# Patient Record
Sex: MALE | Race: OTHER | NOT HISPANIC OR LATINO | Employment: FULL TIME | URBAN - METROPOLITAN AREA
[De-identification: names, ages, dates, MRNs, and addresses within clinical notes are randomized per-mention and may not be internally consistent; named-entity substitution may affect disease eponyms.]

---

## 2022-06-23 ENCOUNTER — ANESTHESIA EVENT (OUTPATIENT)
Dept: SURGERY | Facility: HOSPITAL | Age: 47
DRG: 957 | End: 2022-06-23
Payer: OTHER MISCELLANEOUS

## 2022-06-23 ENCOUNTER — ANESTHESIA (OUTPATIENT)
Dept: SURGERY | Facility: HOSPITAL | Age: 47
DRG: 957 | End: 2022-06-23
Payer: OTHER MISCELLANEOUS

## 2022-06-23 ENCOUNTER — HOSPITAL ENCOUNTER (INPATIENT)
Facility: HOSPITAL | Age: 47
LOS: 18 days | Discharge: HOME | DRG: 957 | End: 2022-07-11
Attending: EMERGENCY MEDICINE | Admitting: SURGERY
Payer: OTHER MISCELLANEOUS

## 2022-06-23 DIAGNOSIS — S62.92XA CLOSED FRACTURE OF LEFT HAND, INITIAL ENCOUNTER: Primary | ICD-10-CM

## 2022-06-23 DIAGNOSIS — I50.30 (HFPEF) HEART FAILURE WITH PRESERVED EJECTION FRACTION: ICD-10-CM

## 2022-06-23 DIAGNOSIS — I82.409 DVT (DEEP VENOUS THROMBOSIS): ICD-10-CM

## 2022-06-23 DIAGNOSIS — T14.90XA TRAUMA: ICD-10-CM

## 2022-06-23 DIAGNOSIS — I26.99 PULMONARY EMBOLI: ICD-10-CM

## 2022-06-23 LAB
ALBUMIN SERPL-MCNC: 3.7 GM/DL (ref 3.5–5)
ALBUMIN/GLOB SERPL: 1.2 RATIO (ref 1.1–2)
ALP SERPL-CCNC: 137 UNIT/L (ref 40–150)
ALT SERPL-CCNC: 71 UNIT/L (ref 0–55)
AMPHET UR QL SCN: NEGATIVE
APPEARANCE UR: CLEAR
APTT PPP: 26.1 SECONDS (ref 23.2–33.7)
AST SERPL-CCNC: 66 UNIT/L (ref 5–34)
BACTERIA #/AREA URNS AUTO: NORMAL /HPF
BARBITURATE SCN PRESENT UR: NEGATIVE
BASE EXCESS ARTERIAL: -2.9 MMOL/L (ref -2–2)
BASOPHILS # BLD AUTO: 0.13 X10(3)/MCL (ref 0–0.2)
BASOPHILS NFR BLD AUTO: 0.6 %
BENZODIAZ UR QL SCN: POSITIVE
BILIRUB UR QL STRIP.AUTO: NEGATIVE MG/DL
BILIRUBIN DIRECT+TOT PNL SERPL-MCNC: 0.8 MG/DL
BUN SERPL-MCNC: 8.7 MG/DL (ref 8.9–20.6)
CALCIUM SERPL-MCNC: 8.8 MG/DL (ref 8.4–10.2)
CANNABINOIDS UR QL SCN: NEGATIVE
CHLORIDE SERPL-SCNC: 105 MMOL/L (ref 98–107)
CO2 SERPL-SCNC: 24 MMOL/L (ref 22–29)
COCAINE UR QL SCN: NEGATIVE
COLOR UR AUTO: YELLOW
CREAT SERPL-MCNC: 0.81 MG/DL (ref 0.73–1.18)
EOSINOPHIL # BLD AUTO: 0.25 X10(3)/MCL (ref 0–0.9)
EOSINOPHIL NFR BLD AUTO: 1.2 %
ERYTHROCYTE [DISTWIDTH] IN BLOOD BY AUTOMATED COUNT: 14.1 % (ref 11.5–17)
ETHANOL SERPL-MCNC: <10 MG/DL
FENTANYL UR QL SCN: NEGATIVE
GLOBULIN SER-MCNC: 3 GM/DL (ref 2.4–3.5)
GLUCOSE SERPL-MCNC: 168 MG/DL (ref 74–100)
GLUCOSE UR QL STRIP.AUTO: NEGATIVE MG/DL
GROUP & RH: NORMAL
HCO3 ARTERIAL: 23.7 MMOL/L (ref 18–23)
HCT VFR BLD AUTO: 44.9 %
HGB BLD-MCNC: 14.5 G/DL
HGB BLD-MCNC: 14.5 GM/DL
IMM GRANULOCYTES # BLD AUTO: 0.21 X10(3)/MCL (ref 0–0.02)
IMM GRANULOCYTES NFR BLD AUTO: 1 % (ref 0–0.43)
INDIRECT COOMBS GEL: NORMAL
INR BLD: 1.01 (ref 0–1.3)
KETONES UR QL STRIP.AUTO: NEGATIVE MG/DL
LACTATE SERPL-SCNC: 3.6 MMOL/L (ref 0.5–2.2)
LACTATE SERPL-SCNC: 4.1 MMOL/L (ref 0.5–2.2)
LACTATE SERPL-SCNC: 4.7 MMOL/L (ref 0.5–2.2)
LEUKOCYTE ESTERASE UR QL STRIP.AUTO: NEGATIVE UNIT/L
LYMPHOCYTES # BLD AUTO: 2.38 X10(3)/MCL (ref 0.6–4.6)
LYMPHOCYTES NFR BLD AUTO: 11.9 %
MCH RBC QN AUTO: 28.3 PG (ref 27–31)
MCHC RBC AUTO-ENTMCNC: 32.3 MG/DL (ref 33–36)
MCV RBC AUTO: 87.7 FL (ref 80–94)
MDMA UR QL SCN: NEGATIVE
MONOCYTES # BLD AUTO: 1.31 X10(3)/MCL (ref 0.1–1.3)
MONOCYTES NFR BLD AUTO: 6.5 %
NEUTROPHILS # BLD AUTO: 15.8 X10(3)/MCL (ref 2.1–9.2)
NEUTROPHILS NFR BLD AUTO: 78.8 %
NITRITE UR QL STRIP.AUTO: NEGATIVE
NRBC BLD AUTO-RTO: 0 %
OPIATES UR QL SCN: NEGATIVE
PCO2 BLDA: 25.1 MM[HG]
PCO2 BLDA: 47 MM[HG]
PCP UR QL: NEGATIVE
PH SMN: 7.31 [PH]
PH UR STRIP.AUTO: 6.5 [PH]
PH UR: 6.5 [PH] (ref 3–11)
PLATELET # BLD AUTO: 216 X10(3)/MCL (ref 130–400)
PMV BLD AUTO: 9.9 FL (ref 9.4–12.4)
PO2 BLDA: 94 MM[HG]
POC COHB: 1.9
POC IONIZED CALCIUM: 1.09
POC METHB: 1.3
POC O2HB: 95.3
POCT GLUCOSE: 191 MG/DL (ref 70–110)
POTASSIUM BLD-SCNC: 3.2 MMOL/L
POTASSIUM SERPL-SCNC: 3.7 MMOL/L (ref 3.5–5.1)
PROT SERPL-MCNC: 6.7 GM/DL (ref 6.4–8.3)
PROT UR QL STRIP.AUTO: ABNORMAL MG/DL
PROTHROMBIN TIME: 13.2 SECONDS (ref 12.5–14.5)
RBC # BLD AUTO: 5.12 X10(6)/MCL
RBC #/AREA URNS AUTO: NORMAL /HPF
RBC UR QL AUTO: NEGATIVE UNIT/L
SARS-COV-2 RDRP RESP QL NAA+PROBE: NEGATIVE
SATURATED O2 ARTERIAL, I-STAT: 96.6
SODIUM BLD-SCNC: 133 MMOL/L
SODIUM SERPL-SCNC: 140 MMOL/L (ref 136–145)
SP GR UR STRIP.AUTO: >=1.04 (ref 1–1.03)
SPECIFIC GRAVITY, URINE AUTO (.000) (OHS): 1.05 (ref 1–1.03)
SQUAMOUS #/AREA URNS AUTO: NORMAL /HPF
UROBILINOGEN UR STRIP-ACNC: 1 MG/DL
WBC # SPEC AUTO: 20 X10(3)/MCL (ref 4.5–11.5)
WBC #/AREA URNS AUTO: NORMAL /HPF

## 2022-06-23 PROCEDURE — 80307 DRUG TEST PRSMV CHEM ANLYZR: CPT | Performed by: EMERGENCY MEDICINE

## 2022-06-23 PROCEDURE — 20800000 HC ICU TRAUMA

## 2022-06-23 PROCEDURE — 63600175 PHARM REV CODE 636 W HCPCS: Performed by: STUDENT IN AN ORGANIZED HEALTH CARE EDUCATION/TRAINING PROGRAM

## 2022-06-23 PROCEDURE — 85025 COMPLETE CBC W/AUTO DIFF WBC: CPT | Performed by: EMERGENCY MEDICINE

## 2022-06-23 PROCEDURE — 82803 BLOOD GASES ANY COMBINATION: CPT

## 2022-06-23 PROCEDURE — 96374 THER/PROPH/DIAG INJ IV PUSH: CPT

## 2022-06-23 PROCEDURE — 36600 WITHDRAWAL OF ARTERIAL BLOOD: CPT

## 2022-06-23 PROCEDURE — 94002 VENT MGMT INPAT INIT DAY: CPT

## 2022-06-23 PROCEDURE — 82077 ASSAY SPEC XCP UR&BREATH IA: CPT | Performed by: EMERGENCY MEDICINE

## 2022-06-23 PROCEDURE — 27201423 OPTIME MED/SURG SUP & DEVICES STERILE SUPPLY: Performed by: NEUROLOGICAL SURGERY

## 2022-06-23 PROCEDURE — 25000003 PHARM REV CODE 250: Performed by: STUDENT IN AN ORGANIZED HEALTH CARE EDUCATION/TRAINING PROGRAM

## 2022-06-23 PROCEDURE — 37000009 HC ANESTHESIA EA ADD 15 MINS: Performed by: NEUROLOGICAL SURGERY

## 2022-06-23 PROCEDURE — C1729 CATH, DRAINAGE: HCPCS | Performed by: NEUROLOGICAL SURGERY

## 2022-06-23 PROCEDURE — 25000003 PHARM REV CODE 250: Performed by: EMERGENCY MEDICINE

## 2022-06-23 PROCEDURE — 63600175 PHARM REV CODE 636 W HCPCS: Performed by: NURSE ANESTHETIST, CERTIFIED REGISTERED

## 2022-06-23 PROCEDURE — 85730 THROMBOPLASTIN TIME PARTIAL: CPT | Performed by: EMERGENCY MEDICINE

## 2022-06-23 PROCEDURE — 37000008 HC ANESTHESIA 1ST 15 MINUTES: Performed by: NEUROLOGICAL SURGERY

## 2022-06-23 PROCEDURE — 36415 COLL VENOUS BLD VENIPUNCTURE: CPT | Performed by: EMERGENCY MEDICINE

## 2022-06-23 PROCEDURE — 25000003 PHARM REV CODE 250: Performed by: NURSE ANESTHETIST, CERTIFIED REGISTERED

## 2022-06-23 PROCEDURE — 27100171 HC OXYGEN HIGH FLOW UP TO 24 HOURS

## 2022-06-23 PROCEDURE — 90715 TDAP VACCINE 7 YRS/> IM: CPT | Performed by: SURGERY

## 2022-06-23 PROCEDURE — 99900026 HC AIRWAY MAINTENANCE (STAT)

## 2022-06-23 PROCEDURE — 83605 ASSAY OF LACTIC ACID: CPT | Performed by: EMERGENCY MEDICINE

## 2022-06-23 PROCEDURE — 90471 IMMUNIZATION ADMIN: CPT | Performed by: SURGERY

## 2022-06-23 PROCEDURE — 63600175 PHARM REV CODE 636 W HCPCS: Performed by: NEUROLOGICAL SURGERY

## 2022-06-23 PROCEDURE — G0390 TRAUMA RESPONS W/HOSP CRITI: HCPCS

## 2022-06-23 PROCEDURE — 36000711: Performed by: NEUROLOGICAL SURGERY

## 2022-06-23 PROCEDURE — 80053 COMPREHEN METABOLIC PANEL: CPT | Performed by: EMERGENCY MEDICINE

## 2022-06-23 PROCEDURE — 25000003 PHARM REV CODE 250: Performed by: NEUROLOGICAL SURGERY

## 2022-06-23 PROCEDURE — 99900035 HC TECH TIME PER 15 MIN (STAT)

## 2022-06-23 PROCEDURE — C9248 INJ, CLEVIDIPINE BUTYRATE: HCPCS | Mod: JG | Performed by: SURGERY

## 2022-06-23 PROCEDURE — 25500020 PHARM REV CODE 255: Performed by: SURGERY

## 2022-06-23 PROCEDURE — 99900031 HC PATIENT EDUCATION (STAT)

## 2022-06-23 PROCEDURE — 63600175 PHARM REV CODE 636 W HCPCS: Performed by: SURGERY

## 2022-06-23 PROCEDURE — 86920 COMPATIBILITY TEST SPIN: CPT | Performed by: STUDENT IN AN ORGANIZED HEALTH CARE EDUCATION/TRAINING PROGRAM

## 2022-06-23 PROCEDURE — C1713 ANCHOR/SCREW BN/BN,TIS/BN: HCPCS | Performed by: NEUROLOGICAL SURGERY

## 2022-06-23 PROCEDURE — 99285 EMERGENCY DEPT VISIT HI MDM: CPT | Mod: 25

## 2022-06-23 PROCEDURE — 36000710: Performed by: NEUROLOGICAL SURGERY

## 2022-06-23 PROCEDURE — 85610 PROTHROMBIN TIME: CPT | Performed by: EMERGENCY MEDICINE

## 2022-06-23 PROCEDURE — 63600175 PHARM REV CODE 636 W HCPCS: Performed by: EMERGENCY MEDICINE

## 2022-06-23 PROCEDURE — 94761 N-INVAS EAR/PLS OXIMETRY MLT: CPT

## 2022-06-23 PROCEDURE — 86901 BLOOD TYPING SEROLOGIC RH(D): CPT | Performed by: EMERGENCY MEDICINE

## 2022-06-23 PROCEDURE — 87635 SARS-COV-2 COVID-19 AMP PRB: CPT | Performed by: STUDENT IN AN ORGANIZED HEALTH CARE EDUCATION/TRAINING PROGRAM

## 2022-06-23 PROCEDURE — 96375 TX/PRO/DX INJ NEW DRUG ADDON: CPT

## 2022-06-23 PROCEDURE — 63600175 PHARM REV CODE 636 W HCPCS

## 2022-06-23 PROCEDURE — C9113 INJ PANTOPRAZOLE SODIUM, VIA: HCPCS | Performed by: STUDENT IN AN ORGANIZED HEALTH CARE EDUCATION/TRAINING PROGRAM

## 2022-06-23 PROCEDURE — 27800903 OPTIME MED/SURG SUP & DEVICES OTHER IMPLANTS: Performed by: NEUROLOGICAL SURGERY

## 2022-06-23 PROCEDURE — 81001 URINALYSIS AUTO W/SCOPE: CPT | Performed by: EMERGENCY MEDICINE

## 2022-06-23 DEVICE — IMPLANTABLE DEVICE: Type: IMPLANTABLE DEVICE | Site: SCALP | Status: FUNCTIONAL

## 2022-06-23 DEVICE — PLATE MATRIXNEURO BRR H 17MM: Type: IMPLANTABLE DEVICE | Site: SCALP | Status: FUNCTIONAL

## 2022-06-23 DEVICE — DURAMATRIX ONLAY 4X5 MEMBRANE: Type: IMPLANTABLE DEVICE | Site: SCALP | Status: FUNCTIONAL

## 2022-06-23 DEVICE — PUTTY GRAFTON DBF 3CC: Type: IMPLANTABLE DEVICE | Site: SCALP | Status: FUNCTIONAL

## 2022-06-23 RX ORDER — SODIUM CHLORIDE 9 MG/ML
INJECTION, SOLUTION INTRAVENOUS CONTINUOUS
Status: DISCONTINUED | OUTPATIENT
Start: 2022-06-23 | End: 2022-07-01

## 2022-06-23 RX ORDER — VASOPRESSIN 20 [USP'U]/ML
INJECTION, SOLUTION INTRAMUSCULAR; SUBCUTANEOUS
Status: DISCONTINUED | OUTPATIENT
Start: 2022-06-23 | End: 2022-06-23

## 2022-06-23 RX ORDER — CEFTRIAXONE 2 G/50ML
2 INJECTION, SOLUTION INTRAVENOUS
Status: DISCONTINUED | OUTPATIENT
Start: 2022-06-23 | End: 2022-06-23

## 2022-06-23 RX ORDER — LIDOCAINE HYDROCHLORIDE AND EPINEPHRINE 5; 5 MG/ML; UG/ML
INJECTION, SOLUTION INFILTRATION; PERINEURAL
Status: DISCONTINUED | OUTPATIENT
Start: 2022-06-23 | End: 2022-06-23 | Stop reason: HOSPADM

## 2022-06-23 RX ORDER — CEFAZOLIN SODIUM 1 G/3ML
INJECTION, POWDER, FOR SOLUTION INTRAMUSCULAR; INTRAVENOUS
Status: DISCONTINUED | OUTPATIENT
Start: 2022-06-23 | End: 2022-06-23

## 2022-06-23 RX ORDER — ROCURONIUM BROMIDE 10 MG/ML
INJECTION, SOLUTION INTRAVENOUS CODE/TRAUMA/SEDATION MEDICATION
Status: COMPLETED | OUTPATIENT
Start: 2022-06-23 | End: 2022-06-23

## 2022-06-23 RX ORDER — GLUCAGON 1 MG
1 KIT INJECTION
Status: DISCONTINUED | OUTPATIENT
Start: 2022-06-23 | End: 2022-07-01

## 2022-06-23 RX ORDER — HYDRALAZINE HYDROCHLORIDE 20 MG/ML
10 INJECTION INTRAMUSCULAR; INTRAVENOUS EVERY 6 HOURS PRN
Status: DISCONTINUED | OUTPATIENT
Start: 2022-06-23 | End: 2022-07-11 | Stop reason: HOSPADM

## 2022-06-23 RX ORDER — PANTOPRAZOLE SODIUM 40 MG/10ML
40 INJECTION, POWDER, LYOPHILIZED, FOR SOLUTION INTRAVENOUS DAILY
Status: DISCONTINUED | OUTPATIENT
Start: 2022-06-23 | End: 2022-07-05

## 2022-06-23 RX ORDER — BACITRACIN 500 [USP'U]/G
OINTMENT TOPICAL
Status: DISCONTINUED | OUTPATIENT
Start: 2022-06-23 | End: 2022-06-23 | Stop reason: HOSPADM

## 2022-06-23 RX ORDER — FENTANYL CITRATE 50 UG/ML
INJECTION, SOLUTION INTRAMUSCULAR; INTRAVENOUS
Status: DISCONTINUED | OUTPATIENT
Start: 2022-06-23 | End: 2022-06-23

## 2022-06-23 RX ORDER — SODIUM CHLORIDE 9 MG/ML
INJECTION, SOLUTION INTRAVENOUS ONCE
Status: COMPLETED | OUTPATIENT
Start: 2022-06-23 | End: 2022-06-23

## 2022-06-23 RX ORDER — PROPOFOL 10 MG/ML
VIAL (ML) INTRAVENOUS
Status: DISCONTINUED | OUTPATIENT
Start: 2022-06-23 | End: 2022-06-23

## 2022-06-23 RX ORDER — SODIUM CHLORIDE 0.9 % (FLUSH) 0.9 %
10 SYRINGE (ML) INJECTION
Status: DISCONTINUED | OUTPATIENT
Start: 2022-06-23 | End: 2022-07-11 | Stop reason: HOSPADM

## 2022-06-23 RX ORDER — NOREPINEPHRINE BITARTRATE/D5W 8 MG/250ML
0-3 PLASTIC BAG, INJECTION (ML) INTRAVENOUS CONTINUOUS
Status: DISCONTINUED | OUTPATIENT
Start: 2022-06-23 | End: 2022-06-28

## 2022-06-23 RX ORDER — LEVETIRACETAM 10 MG/ML
1000 INJECTION INTRAVASCULAR
Status: COMPLETED | OUTPATIENT
Start: 2022-06-23 | End: 2022-06-23

## 2022-06-23 RX ORDER — SODIUM CHLORIDE 9 MG/ML
INJECTION, SOLUTION INTRAVENOUS CONTINUOUS
Status: DISCONTINUED | OUTPATIENT
Start: 2022-06-23 | End: 2022-06-23

## 2022-06-23 RX ORDER — FENTANYL CITRATE-0.9 % NACL/PF 10 MCG/ML
0-250 PLASTIC BAG, INJECTION (ML) INTRAVENOUS CONTINUOUS
Status: DISCONTINUED | OUTPATIENT
Start: 2022-06-23 | End: 2022-06-28

## 2022-06-23 RX ORDER — ETOMIDATE 2 MG/ML
INJECTION INTRAVENOUS CODE/TRAUMA/SEDATION MEDICATION
Status: COMPLETED | OUTPATIENT
Start: 2022-06-23 | End: 2022-06-23

## 2022-06-23 RX ORDER — PHENYLEPHRINE HYDROCHLORIDE 10 MG/ML
INJECTION INTRAVENOUS
Status: DISCONTINUED | OUTPATIENT
Start: 2022-06-23 | End: 2022-06-23

## 2022-06-23 RX ORDER — CEFAZOLIN SODIUM 1 G/3ML
INJECTION, POWDER, FOR SOLUTION INTRAMUSCULAR; INTRAVENOUS
Status: DISCONTINUED | OUTPATIENT
Start: 2022-06-23 | End: 2022-06-23 | Stop reason: HOSPADM

## 2022-06-23 RX ORDER — HYDRALAZINE HYDROCHLORIDE 20 MG/ML
10 INJECTION INTRAMUSCULAR; INTRAVENOUS EVERY 4 HOURS PRN
Status: DISCONTINUED | OUTPATIENT
Start: 2022-06-23 | End: 2022-06-23

## 2022-06-23 RX ORDER — ROCURONIUM BROMIDE 10 MG/ML
INJECTION, SOLUTION INTRAVENOUS
Status: DISCONTINUED | OUTPATIENT
Start: 2022-06-23 | End: 2022-06-23

## 2022-06-23 RX ORDER — HYDRALAZINE HYDROCHLORIDE 20 MG/ML
10 INJECTION INTRAMUSCULAR; INTRAVENOUS EVERY 6 HOURS PRN
Status: DISCONTINUED | OUTPATIENT
Start: 2022-06-23 | End: 2022-06-23

## 2022-06-23 RX ORDER — HYDRALAZINE HYDROCHLORIDE 20 MG/ML
INJECTION INTRAMUSCULAR; INTRAVENOUS
Status: COMPLETED
Start: 2022-06-23 | End: 2022-06-23

## 2022-06-23 RX ORDER — PROPOFOL 10 MG/ML
0-50 INJECTION, EMULSION INTRAVENOUS CONTINUOUS
Status: DISCONTINUED | OUTPATIENT
Start: 2022-06-23 | End: 2022-06-28

## 2022-06-23 RX ORDER — ENALAPRILAT 1.25 MG/ML
1.25 INJECTION INTRAVENOUS EVERY 6 HOURS PRN
Status: DISCONTINUED | OUTPATIENT
Start: 2022-06-23 | End: 2022-07-11 | Stop reason: HOSPADM

## 2022-06-23 RX ADMIN — PHENYLEPHRINE HYDROCHLORIDE 100 MCG: 10 INJECTION INTRAVENOUS at 05:06

## 2022-06-23 RX ADMIN — PROPOFOL 35 MCG/KG/MIN: 10 INJECTION, EMULSION INTRAVENOUS at 12:06

## 2022-06-23 RX ADMIN — ROCURONIUM BROMIDE 120 MG: 10 INJECTION, SOLUTION INTRAVENOUS at 10:06

## 2022-06-23 RX ADMIN — ROCURONIUM BROMIDE 30 MG: 10 SOLUTION INTRAVENOUS at 06:06

## 2022-06-23 RX ADMIN — CEFTRIAXONE 2 G: 2 INJECTION, SOLUTION INTRAVENOUS at 01:06

## 2022-06-23 RX ADMIN — VANCOMYCIN HYDROCHLORIDE 1500 MG: 1.5 INJECTION, POWDER, LYOPHILIZED, FOR SOLUTION INTRAVENOUS at 08:06

## 2022-06-23 RX ADMIN — VASOPRESSIN 1 UNITS: 20 INJECTION INTRAVENOUS at 06:06

## 2022-06-23 RX ADMIN — HYDRALAZINE HYDROCHLORIDE 10 MG: 20 INJECTION, SOLUTION INTRAMUSCULAR; INTRAVENOUS at 11:06

## 2022-06-23 RX ADMIN — HYDRALAZINE HYDROCHLORIDE 10 MG: 20 INJECTION INTRAMUSCULAR; INTRAVENOUS at 11:06

## 2022-06-23 RX ADMIN — IOPAMIDOL 100 ML: 755 INJECTION, SOLUTION INTRAVENOUS at 12:06

## 2022-06-23 RX ADMIN — ROCURONIUM BROMIDE 20 MG: 10 SOLUTION INTRAVENOUS at 07:06

## 2022-06-23 RX ADMIN — PIPERACILLIN SODIUM AND TAZOBACTAM SODIUM 4.5 G: 4; .5 INJECTION, POWDER, LYOPHILIZED, FOR SOLUTION INTRAVENOUS at 08:06

## 2022-06-23 RX ADMIN — PROPOFOL 100 MG: 10 INJECTION, EMULSION INTRAVENOUS at 05:06

## 2022-06-23 RX ADMIN — PANTOPRAZOLE SODIUM 40 MG: 40 INJECTION, POWDER, FOR SOLUTION INTRAVENOUS at 12:06

## 2022-06-23 RX ADMIN — LEVETIRACETAM INJECTION 1000 MG: 10 INJECTION INTRAVENOUS at 11:06

## 2022-06-23 RX ADMIN — SODIUM CHLORIDE: 9 INJECTION, SOLUTION INTRAVENOUS at 12:06

## 2022-06-23 RX ADMIN — SODIUM CHLORIDE, SODIUM GLUCONATE, SODIUM ACETATE, POTASSIUM CHLORIDE AND MAGNESIUM CHLORIDE: 526; 502; 368; 37; 30 INJECTION, SOLUTION INTRAVENOUS at 05:06

## 2022-06-23 RX ADMIN — ETOMIDATE 20 MG: 2 INJECTION INTRAVENOUS at 10:06

## 2022-06-23 RX ADMIN — SODIUM CHLORIDE, SODIUM GLUCONATE, SODIUM ACETATE, POTASSIUM CHLORIDE AND MAGNESIUM CHLORIDE: 526; 502; 368; 37; 30 INJECTION, SOLUTION INTRAVENOUS at 06:06

## 2022-06-23 RX ADMIN — FENTANYL CITRATE 100 MCG: 50 INJECTION, SOLUTION INTRAMUSCULAR; INTRAVENOUS at 05:06

## 2022-06-23 RX ADMIN — VASOPRESSIN 2 UNITS: 20 INJECTION INTRAVENOUS at 06:06

## 2022-06-23 RX ADMIN — CEFAZOLIN 2 G: 330 INJECTION, POWDER, FOR SOLUTION INTRAMUSCULAR; INTRAVENOUS at 05:06

## 2022-06-23 RX ADMIN — SODIUM CHLORIDE 500 ML: 9 INJECTION, SOLUTION INTRAVENOUS at 12:06

## 2022-06-23 RX ADMIN — SODIUM CHLORIDE: 9 INJECTION, SOLUTION INTRAVENOUS at 10:06

## 2022-06-23 RX ADMIN — TETANUS TOXOID, REDUCED DIPHTHERIA TOXOID AND ACELLULAR PERTUSSIS VACCINE, ADSORBED 0.5 ML: 5; 2.5; 8; 8; 2.5 SUSPENSION INTRAMUSCULAR at 01:06

## 2022-06-23 RX ADMIN — CLEVIPIDINE 3 MG/HR: 0.5 EMULSION INTRAVENOUS at 12:06

## 2022-06-23 RX ADMIN — VASOPRESSIN 2 UNITS: 20 INJECTION INTRAVENOUS at 07:06

## 2022-06-23 RX ADMIN — PROPOFOL 25 MCG/KG/MIN: 10 INJECTION, EMULSION INTRAVENOUS at 02:06

## 2022-06-23 RX ADMIN — FENTANYL CITRATE 100 MCG: 50 INJECTION, SOLUTION INTRAMUSCULAR; INTRAVENOUS at 07:06

## 2022-06-23 RX ADMIN — SODIUM CHLORIDE: 9 INJECTION, SOLUTION INTRAVENOUS at 11:06

## 2022-06-23 RX ADMIN — Medication 50 MCG/HR: at 12:06

## 2022-06-23 RX ADMIN — PROPOFOL 30 MCG/KG/MIN: 10 INJECTION, EMULSION INTRAVENOUS at 11:06

## 2022-06-23 RX ADMIN — ROCURONIUM BROMIDE 20 MG: 10 SOLUTION INTRAVENOUS at 06:06

## 2022-06-23 RX ADMIN — ROCURONIUM BROMIDE 50 MG: 10 SOLUTION INTRAVENOUS at 05:06
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
## 2022-06-23 NOTE — PROGRESS NOTES
Pharmacokinetic Initial Assessment: IV Vancomycin    Assessment/Plan:    Initiate intravenous vancomycin 1500 mg q8h   Desired empiric serum trough concentration is 15 to 20 mcg/mL  Draw vancomycin trough level 60 min prior to fourth dose on 6/24 at approximately 1800  Pharmacy will continue to follow and monitor vancomycin.      Please contact pharmacy at extension [phone] with any questions regarding this assessment.     Thank you for the consult,   Christiano Pavon       Patient brief summary:  Alexander Ortiz is a 46 y.o. male initiated on antimicrobial therapy with IV Vancomycin for treatment of suspected skin & soft tissue infection    Drug Allergies:   Review of patient's allergies indicates:  No Known Allergies    Actual Body Weight:   120 kg    Renal Function:   Estimated Creatinine Clearance: 148.8 mL/min (based on SCr of 0.81 mg/dL).,     Dialysis Method (if applicable):  N/A    CBC (last 72 hours):  Recent Labs   Lab Result Units 06/23/22  1148   WBC x10(3)/mcL 20.0*   Hgb gm/dL 14.5   Hct % 44.9   Platelet x10(3)/mcL 216   Mono % % 6.5   Eos % % 1.2   Basophil % % 0.6       Metabolic Panel (last 72 hours):  Recent Labs   Lab Result Units 06/23/22  1148 06/23/22  1400   Sodium Level mmol/L 140  --    Potassium Level mmol/L 3.7  --    Chloride mmol/L 105  --    Carbon Dioxide mmol/L 24  --    Glucose Level mg/dL 168*  --    Glucose, UA mg/dL  --  Negative   Blood Urea Nitrogen mg/dL 8.7*  --    Creatinine mg/dL 0.81  --    Albumin Level gm/dL 3.7  --    Bilirubin Total mg/dL 0.8  --    Alkaline Phosphatase unit/L 137  --    Aspartate Aminotransferase unit/L 66*  --    Alanine Aminotransferase unit/L 71*  --        Drug levels (last 3 results):  No results for input(s): VANCOMYCINRA, VANCORANDOM, VANCOMYCINPE, VANCOPEAK, VANCOMYCINTR, VANCOTROUGH in the last 72 hours.    Microbiologic Results:  Microbiology Results (last 7 days)       ** No results found for the last 168 hours. **

## 2022-06-23 NOTE — ANESTHESIA PREPROCEDURE EVALUATION
06/23/2022  Alexander Ortiz is a 46 y.o., male.      Pre-op Assessment    I have reviewed the Patient Summary Reports.     I have reviewed the Nursing Notes. I have reviewed the NPO Status.   I have reviewed the Medications.     Review of Systems      Physical Exam  General: Unconscious    Airway:  Pre-Existing Airway: Oral Endotracheal tube  Intubated unconscious with open head injury in C-Collar. No evidence of c/spine injury on CT.  Chest/Lungs:  Clear to auscultation    Heart:  Rate: Normal  Rhythm: Regular Rhythm        Anesthesia Plan  Type of Anesthesia, risks & benefits discussed:    Anesthesia Type: Gen ETT  Intra-op Monitoring Plan: Standard ASA Monitors and Art Line  Post Op Pain Control Plan: multimodal analgesia  Induction:  IV and Inhalation  Airway Plan:  with cervical collar  ASA Score: 3 Emergent  Day of Surgery Review of History & Physical: I have interviewed and examined the patient. I have reviewed the patient's H&P dated: There are no significant changes.     Ready For Surgery From Anesthesia Perspective.     .

## 2022-06-23 NOTE — PROCEDURES
Arterial Catheter Insertion Procedure Note     Procedure: Insertion of Arterial Catheter     Indications: Hemodynamic Monitoring and frequent blood draws    Procedure Details     Maximum sterile technique was used including antiseptics, cap, sterile gloves, sterile gown, hand hygiene, mask and sterile maximum barrier drape.     Under sterile conditions the skin above the left radial artery was prepped with Chloroprep and covered with a sterile drape. Local anesthesia was applied to the skin and subcutaneous tissues. Ultrasound guidance was used to identify the artery. A 22 -gauge catheter over a needle was then inserted into the artery. A guide wire was then passed easily through the needle. The needle was then withdrawn. A arterial catheter was then inserted into the vessel over the guide wire. The needle was then withdrawn and was connected to the monitor to ensure a proper waveform. The catheter was sutured into place and a sterile dressing was applied.     Ultrasound/Sonosite was used during the procedure.      Estimated blood loss: < 2 ml    Patient tolerated procedure well.    Hamzah Pang, DO  6/23/2022

## 2022-06-23 NOTE — ANESTHESIA PROCEDURE NOTES
Arterial    Diagnosis: craniotomy - trauma    Patient location during procedure: done in OR  Procedure start time: 6/23/2022 5:31 PM  Timeout: 6/23/2022 5:31 PM  Procedure end time: 6/23/2022 5:31 PM    Staffing  Authorizing Provider: Cong Mckinley MD  Performing Provider: Gee Mendoza CRNA    Anesthesiologist was present at the time of the procedure.    Preanesthetic Checklist  Completed: patient identified, IV checked, site marked, risks and benefits discussed, surgical consent, monitors and equipment checked, pre-op evaluation, timeout performed and anesthesia consent givenArterial  Skin Prep: chlorhexidine gluconate  Local Infiltration: none  Orientation: right  Location: radial    Catheter Size: 20 G  Catheter placement by Anatomical landmarks. Heme positive aspiration all ports. Insertion Attempts: 1  Assessment  Dressing: secured with tape and tegaderm  Patient: Tolerated well

## 2022-06-24 LAB
ALBUMIN SERPL-MCNC: 2.7 GM/DL (ref 3.5–5)
ALBUMIN/GLOB SERPL: 1 RATIO (ref 1.1–2)
ALP SERPL-CCNC: 83 UNIT/L (ref 40–150)
ALT SERPL-CCNC: 43 UNIT/L (ref 0–55)
AST SERPL-CCNC: 33 UNIT/L (ref 5–34)
AV INDEX (PROSTH): 0.91
AV MEAN GRADIENT: 8 MMHG
AV PEAK GRADIENT: 13 MMHG
AV VALVE AREA: 2.85 CM2
AV VELOCITY RATIO: 0.92
BASOPHILS # BLD AUTO: 0.07 X10(3)/MCL (ref 0–0.2)
BASOPHILS NFR BLD AUTO: 0.3 %
BILIRUBIN DIRECT+TOT PNL SERPL-MCNC: 0.8 MG/DL
BSA FOR ECHO PROCEDURE: 2.42 M2
BUN SERPL-MCNC: 9.7 MG/DL (ref 8.9–20.6)
CALCIUM SERPL-MCNC: 7.6 MG/DL (ref 8.4–10.2)
CHLORIDE SERPL-SCNC: 112 MMOL/L (ref 98–107)
CO2 SERPL-SCNC: 24 MMOL/L (ref 22–29)
CORRECTED TEMPERATURE (PCO2): 45 MMHG (ref 35–45)
CORRECTED TEMPERATURE (PH): 7.37 (ref 7.35–7.45)
CORRECTED TEMPERATURE (PO2): 162 MMHG (ref 80–100)
CREAT SERPL-MCNC: 0.78 MG/DL (ref 0.73–1.18)
DOP CALC AO PEAK VEL: 1.83 M/S
DOP CALC AO VTI: 22.4 CM
DOP CALC LVOT AREA: 3.1 CM2
DOP CALC LVOT DIAMETER: 2 CM
DOP CALC LVOT PEAK VEL: 1.68 M/S
DOP CALC LVOT STROKE VOLUME: 63.74 CM3
DOP CALC MV VTI: 18 CM
DOP CALCLVOT PEAK VEL VTI: 20.3 CM
E WAVE DECELERATION TIME: 145 MSEC
E/A RATIO: 0.67
E/E' RATIO: 7.06 M/S
EJECTION FRACTION: 55 %
EOSINOPHIL # BLD AUTO: 0.06 X10(3)/MCL (ref 0–0.9)
EOSINOPHIL NFR BLD AUTO: 0.3 %
ERYTHROCYTE [DISTWIDTH] IN BLOOD BY AUTOMATED COUNT: 14.9 % (ref 11.5–17)
GLOBULIN SER-MCNC: 2.6 GM/DL (ref 2.4–3.5)
GLUCOSE SERPL-MCNC: 176 MG/DL (ref 74–100)
HCO3 UR-SCNC: 26 MMOL/L
HCT VFR BLD AUTO: 31 % (ref 42–52)
HGB BLD-MCNC: 10.2 GM/DL (ref 14–18)
HGB BLD-MCNC: 10.4 G/DL (ref 12–16)
IMM GRANULOCYTES # BLD AUTO: 0.25 X10(3)/MCL (ref 0–0.02)
IMM GRANULOCYTES NFR BLD AUTO: 1.1 % (ref 0–0.43)
LACTATE SERPL-SCNC: 1.8 MMOL/L (ref 0.5–2.2)
LACTATE SERPL-SCNC: 2.1 MMOL/L (ref 0.5–2.2)
LACTATE SERPL-SCNC: 2.3 MMOL/L (ref 0.5–2.2)
LACTATE SERPL-SCNC: 3.7 MMOL/L (ref 0.5–2.2)
LEFT ATRIUM SIZE: 3.2 CM
LEFT VENTRICLE DIASTOLIC VOLUME INDEX: 41.24 ML/M2
LEFT VENTRICLE DIASTOLIC VOLUME: 96.1 ML
LEFT VENTRICLE SYSTOLIC VOLUME INDEX: 19.4 ML/M2
LEFT VENTRICLE SYSTOLIC VOLUME: 45.2 ML
LV LATERAL E/E' RATIO: 6 M/S
LV SEPTAL E/E' RATIO: 8.57 M/S
LVOT MG: 6 MMHG
LVOT MV: 1.07 CM/S
LYMPHOCYTES # BLD AUTO: 1.03 X10(3)/MCL (ref 0.6–4.6)
LYMPHOCYTES NFR BLD AUTO: 4.5 %
MAGNESIUM SERPL-MCNC: 1.9 MG/DL (ref 1.6–2.6)
MCH RBC QN AUTO: 28.9 PG (ref 27–31)
MCHC RBC AUTO-ENTMCNC: 32.9 MG/DL (ref 33–36)
MCV RBC AUTO: 87.8 FL (ref 80–94)
MONOCYTES # BLD AUTO: 1.33 X10(3)/MCL (ref 0.1–1.3)
MONOCYTES NFR BLD AUTO: 5.9 %
MV MEAN GRADIENT: 3 MMHG
MV PEAK A VEL: 0.89 M/S
MV PEAK E VEL: 0.6 M/S
MV PEAK GRADIENT: 4 MMHG
MV STENOSIS PRESSURE HALF TIME: 69 MS
MV VALVE AREA BY CONTINUITY EQUATION: 3.54 CM2
MV VALVE AREA P 1/2 METHOD: 3.19 CM2
NEUTROPHILS # BLD AUTO: 20 X10(3)/MCL (ref 2.1–9.2)
NEUTROPHILS NFR BLD AUTO: 87.9 %
NRBC BLD AUTO-RTO: 0 %
PCO2 BLDA: 45 MMHG (ref 35–45)
PH SMN: 7.37 [PH] (ref 7.35–7.45)
PHOSPHATE SERPL-MCNC: 2.1 MG/DL (ref 2.3–4.7)
PISA TR MAX VEL: 1.55 M/S
PLATELET # BLD AUTO: 177 X10(3)/MCL (ref 130–400)
PMV BLD AUTO: 10.1 FL (ref 9.4–12.4)
PO2 BLDA: 162 MMHG (ref 80–100)
POC BASE DEFICIT: 0.4 MMOL/L (ref -2–2)
POC COHB: 1.8 %
POC IONIZED CALCIUM: 1.1 MMOL/L (ref 1.12–1.23)
POC METHB: 1.4 % (ref 0.4–1.5)
POC O2HB: 96.7 % (ref 94–97)
POC SATURATED O2: 99.4 %
POC TEMPERATURE: 37 °C
POCT GLUCOSE: 159 MG/DL (ref 70–110)
POTASSIUM BLD-SCNC: 4.2 MMOL/L (ref 3.5–5)
POTASSIUM SERPL-SCNC: 4.3 MMOL/L (ref 3.5–5.1)
PROT SERPL-MCNC: 5.3 GM/DL (ref 6.4–8.3)
PV PEAK VELOCITY: 1.43 CM/S
RA PRESSURE: 3 MMHG
RBC # BLD AUTO: 3.53 X10(6)/MCL (ref 4.7–6.1)
SODIUM BLD-SCNC: 140 MMOL/L (ref 137–145)
SODIUM SERPL-SCNC: 142 MMOL/L (ref 136–145)
SPECIMEN SOURCE: ABNORMAL
TDI LATERAL: 0.1 M/S
TDI SEPTAL: 0.07 M/S
TDI: 0.09 M/S
TR MAX PG: 10 MMHG
TRICUSPID ANNULAR PLANE SYSTOLIC EXCURSION: 1.99 CM
TV REST PULMONARY ARTERY PRESSURE: 13 MMHG
VANCOMYCIN TROUGH SERPL-MCNC: 15.2 UG/ML (ref 15–20)
WBC # SPEC AUTO: 22.7 X10(3)/MCL (ref 4.5–11.5)

## 2022-06-24 PROCEDURE — 83605 ASSAY OF LACTIC ACID: CPT | Performed by: EMERGENCY MEDICINE

## 2022-06-24 PROCEDURE — 25000003 PHARM REV CODE 250: Performed by: RADIOLOGY

## 2022-06-24 PROCEDURE — C9113 INJ PANTOPRAZOLE SODIUM, VIA: HCPCS | Performed by: STUDENT IN AN ORGANIZED HEALTH CARE EDUCATION/TRAINING PROGRAM

## 2022-06-24 PROCEDURE — 84100 ASSAY OF PHOSPHORUS: CPT | Performed by: STUDENT IN AN ORGANIZED HEALTH CARE EDUCATION/TRAINING PROGRAM

## 2022-06-24 PROCEDURE — 27200966 HC CLOSED SUCTION SYSTEM

## 2022-06-24 PROCEDURE — 80202 ASSAY OF VANCOMYCIN: CPT | Performed by: STUDENT IN AN ORGANIZED HEALTH CARE EDUCATION/TRAINING PROGRAM

## 2022-06-24 PROCEDURE — 36415 COLL VENOUS BLD VENIPUNCTURE: CPT | Performed by: EMERGENCY MEDICINE

## 2022-06-24 PROCEDURE — 25000003 PHARM REV CODE 250: Performed by: NURSE PRACTITIONER

## 2022-06-24 PROCEDURE — 63600175 PHARM REV CODE 636 W HCPCS: Performed by: STUDENT IN AN ORGANIZED HEALTH CARE EDUCATION/TRAINING PROGRAM

## 2022-06-24 PROCEDURE — 87077 CULTURE AEROBIC IDENTIFY: CPT | Performed by: INTERNAL MEDICINE

## 2022-06-24 PROCEDURE — 82803 BLOOD GASES ANY COMBINATION: CPT

## 2022-06-24 PROCEDURE — 99900026 HC AIRWAY MAINTENANCE (STAT)

## 2022-06-24 PROCEDURE — 80053 COMPREHEN METABOLIC PANEL: CPT | Performed by: STUDENT IN AN ORGANIZED HEALTH CARE EDUCATION/TRAINING PROGRAM

## 2022-06-24 PROCEDURE — 27000221 HC OXYGEN, UP TO 24 HOURS

## 2022-06-24 PROCEDURE — 83735 ASSAY OF MAGNESIUM: CPT | Performed by: STUDENT IN AN ORGANIZED HEALTH CARE EDUCATION/TRAINING PROGRAM

## 2022-06-24 PROCEDURE — 94761 N-INVAS EAR/PLS OXIMETRY MLT: CPT

## 2022-06-24 PROCEDURE — 25000003 PHARM REV CODE 250: Performed by: STUDENT IN AN ORGANIZED HEALTH CARE EDUCATION/TRAINING PROGRAM

## 2022-06-24 PROCEDURE — 85025 COMPLETE CBC W/AUTO DIFF WBC: CPT | Performed by: STUDENT IN AN ORGANIZED HEALTH CARE EDUCATION/TRAINING PROGRAM

## 2022-06-24 PROCEDURE — 63600175 PHARM REV CODE 636 W HCPCS: Performed by: NURSE PRACTITIONER

## 2022-06-24 PROCEDURE — 87040 BLOOD CULTURE FOR BACTERIA: CPT | Performed by: INTERNAL MEDICINE

## 2022-06-24 PROCEDURE — 20800000 HC ICU TRAUMA

## 2022-06-24 PROCEDURE — 99900035 HC TECH TIME PER 15 MIN (STAT)

## 2022-06-24 PROCEDURE — 37799 UNLISTED PX VASCULAR SURGERY: CPT

## 2022-06-24 PROCEDURE — 94003 VENT MGMT INPAT SUBQ DAY: CPT

## 2022-06-24 PROCEDURE — 36415 COLL VENOUS BLD VENIPUNCTURE: CPT | Performed by: STUDENT IN AN ORGANIZED HEALTH CARE EDUCATION/TRAINING PROGRAM

## 2022-06-24 RX ORDER — LIDOCAINE HYDROCHLORIDE 20 MG/ML
INJECTION, SOLUTION INFILTRATION; PERINEURAL CODE/TRAUMA/SEDATION MEDICATION
Status: COMPLETED | OUTPATIENT
Start: 2022-06-24 | End: 2022-06-24

## 2022-06-24 RX ADMIN — LEVETIRACETAM 500 MG: 100 INJECTION, SOLUTION INTRAVENOUS at 10:06

## 2022-06-24 RX ADMIN — PROPOFOL 25 MCG/KG/MIN: 10 INJECTION, EMULSION INTRAVENOUS at 06:06

## 2022-06-24 RX ADMIN — VANCOMYCIN HYDROCHLORIDE 1500 MG: 1.5 INJECTION, POWDER, LYOPHILIZED, FOR SOLUTION INTRAVENOUS at 10:06

## 2022-06-24 RX ADMIN — PROPOFOL 25 MCG/KG/MIN: 10 INJECTION, EMULSION INTRAVENOUS at 09:06

## 2022-06-24 RX ADMIN — LEVETIRACETAM 500 MG: 100 INJECTION, SOLUTION INTRAVENOUS at 08:06

## 2022-06-24 RX ADMIN — VANCOMYCIN HYDROCHLORIDE 1500 MG: 1.5 INJECTION, POWDER, LYOPHILIZED, FOR SOLUTION INTRAVENOUS at 04:06

## 2022-06-24 RX ADMIN — PANTOPRAZOLE SODIUM 40 MG: 40 INJECTION, POWDER, FOR SOLUTION INTRAVENOUS at 08:06

## 2022-06-24 RX ADMIN — PIPERACILLIN SODIUM AND TAZOBACTAM SODIUM 4.5 G: 4; .5 INJECTION, POWDER, LYOPHILIZED, FOR SOLUTION INTRAVENOUS at 04:06

## 2022-06-24 RX ADMIN — Medication 75 MCG/HR: at 09:06

## 2022-06-24 RX ADMIN — PIPERACILLIN SODIUM AND TAZOBACTAM SODIUM 4.5 G: 4; .5 INJECTION, POWDER, LYOPHILIZED, FOR SOLUTION INTRAVENOUS at 12:06

## 2022-06-24 RX ADMIN — VANCOMYCIN HYDROCHLORIDE 1500 MG: 1.5 INJECTION, POWDER, LYOPHILIZED, FOR SOLUTION INTRAVENOUS at 06:06

## 2022-06-24 RX ADMIN — PIPERACILLIN SODIUM AND TAZOBACTAM SODIUM 4.5 G: 4; .5 INJECTION, POWDER, LYOPHILIZED, FOR SOLUTION INTRAVENOUS at 08:06

## 2022-06-24 RX ADMIN — PROPOFOL 25 MCG/KG/MIN: 10 INJECTION, EMULSION INTRAVENOUS at 12:06

## 2022-06-24 RX ADMIN — LIDOCAINE HYDROCHLORIDE 10 ML: 20 INJECTION, SOLUTION INFILTRATION; PERINEURAL at 11:06
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
## 2022-06-24 NOTE — SEDATION DOCUMENTATION
Access catheter placed to right jugular under ultrasound guidance-IVC filter deployed under fluoro
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
## 2022-06-24 NOTE — PROGRESS NOTES
Mr Munoz went to the OR yesterday (please see my previously dictate Op note / Consult note).    No issues overnight - he is supine so difficult to assess for CSF rhinorrhea.  Will keep an eye on him to see how things progress.
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
## 2022-06-24 NOTE — PROGRESS NOTES
Pharmacokinetic Assessment Follow Up: IV Vancomycin    Vancomycin serum concentration assessment(s):    The trough level was drawn correctly and can be used to guide therapy at this time. The measurement is within the desired definitive target range of 15 to 20 mcg/mL.    Vancomycin Regimen Plan:    Continue regimen to Vancomycin 1500 mg IV every 8 hours with next serum trough concentration measured at 1000 on 6/26    Drug levels (last 3 results):  Recent Labs   Lab Result Units 06/24/22  1738   Vancomycin Trough ug/ml 15.2       Pharmacy will continue to follow and monitor vancomycin.    Please contact pharmacy at extension [phone] for questions regarding this assessment.    Thank you for the consult,   Purvi León       Patient brief summary:  Alexander Ortiz is a 46 y.o. male initiated on antimicrobial therapy with IV Vancomycin for treatment of skin & soft tissue infection    The patient's current regimen is 1500 mg every 8 hours    Drug Allergies:   Review of patient's allergies indicates:  No Known Allergies    Actual Body Weight:   120 kg    Renal Function:   Estimated Creatinine Clearance: 154.5 mL/min (based on SCr of 0.78 mg/dL).,     Dialysis Method (if applicable):  N/A    CBC (last 72 hours):  Recent Labs   Lab Result Units 06/23/22  1148 06/24/22  0251   WBC x10(3)/mcL 20.0* 22.7*   Hgb gm/dL 14.5 10.2*   Hct % 44.9 31.0*   Platelet x10(3)/mcL 216 177   Mono % % 6.5 5.9   Eos % % 1.2 0.3   Basophil % % 0.6 0.3       Metabolic Panel (last 72 hours):  Recent Labs   Lab Result Units 06/23/22  1148 06/23/22  1400 06/24/22  0251   Sodium Level mmol/L 140  --  142   Potassium Level mmol/L 3.7  --  4.3   Chloride mmol/L 105  --  112*   Carbon Dioxide mmol/L 24  --  24   Glucose Level mg/dL 168*  --  176*   Glucose, UA mg/dL  --  Negative  --    Blood Urea Nitrogen mg/dL 8.7*  --  9.7   Creatinine mg/dL 0.81  --  0.78   Albumin Level gm/dL 3.7  --  2.7*   Bilirubin Total mg/dL 0.8  --  0.8   Alkaline  Phosphatase unit/L 137  --  83   Aspartate Aminotransferase unit/L 66*  --  33   Alanine Aminotransferase unit/L 71*  --  43   Magnesium Level mg/dL  --   --  1.90   Phosphorus Level mg/dL  --   --  2.1*       Vancomycin Administrations:  vancomycin given in the last 96 hours                     vancomycin 1.5 g in dextrose 5 % 250 mL IVPB (ready to mix) (mg) 1,500 mg New Bag 06/24/22 1812     1,500 mg New Bag  1038     1,500 mg New Bag  0436     1,500 mg New Bag 06/23/22 2030                    Microbiologic Results:  Microbiology Results (last 7 days)       Procedure Component Value Units Date/Time    Blood Culture [[id number]] Collected: 06/24/22 1047    Order Status: Sent Specimen: Blood from Arm, Right Updated: 06/24/22 1047    Blood Culture [[id number]] Collected: 06/24/22 1022    Order Status: Resulted Specimen: Blood from Arm, Left Updated: 06/24/22 1047    Respiratory Culture [[id number]] Collected: 06/24/22 1034    Order Status: Resulted Specimen: Respiratory from Endotracheal Aspirate Updated: 06/24/22 1034

## 2022-06-24 NOTE — OP NOTE
Radiology Post-Procedure Note    Pre Op Diagnosis: PE    Post Op Diagnosis: PE    Procedure: IVC Filter Placement    Procedure performed by: Frank Childress M.D.    Written Informed Consent Obtained: Yes    Specimen Removed: NO    Estimated Blood Loss: Minimal    Findings:   Standard IVC Filter placement    Patient tolerated procedure well.    Frank Childress MD
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
## 2022-06-24 NOTE — PROGRESS NOTES
Op note:    Bifrontal Craniotomy for repair of skull fractures, frontal sinus exoneration, repair of dura, repair of fractures,open wound.    Zeynep Barron.    Findings: Dictated.    EBL:250.    Drains:1 roseanne under the scalp.
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
## 2022-06-24 NOTE — TRANSFER OF CARE
"Anesthesia Transfer of Care Note    Patient: Alexander Ortiz    Procedure(s) Performed: Procedure(s) (LRB):  CRANIOTOMY (N/A)  DEBRIDEMENT-SINUS (Bilateral)    Patient location: ICU    Anesthesia Type: general    Transport from OR: Transported from OR intubated on 100% O2 by AMBU with assisted ventilation    Post pain: adequate analgesia    Post assessment: no apparent anesthetic complications    Post vital signs: stable    Level of consciousness: sedated    Nausea/Vomiting: no nausea/vomiting    Complications: none    Transfer of care protocol was followed      Last vitals:   Visit Vitals  /69   Pulse (!) 115   Temp 37 °C (98.6 °F)   Resp (!) 23   Ht 5' 9.02" (1.753 m)   Wt 120 kg (264 lb 8.8 oz)   SpO2 99%   BMI 39.05 kg/m²     "
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
## 2022-06-24 NOTE — ANESTHESIA POSTPROCEDURE EVALUATION
Anesthesia Post Evaluation    Patient: Alexander Ortiz    Procedure(s) Performed: Procedure(s) (LRB):  CRANIOTOMY (N/A)  DEBRIDEMENT-SINUS (Bilateral)    Final Anesthesia Type: general      Patient location during evaluation: ICU  Patient participation: No - Unable to Participate, Intubation  Level of consciousness: obtunded/minimal responses  Post-procedure vital signs: reviewed and stable  Pain management: adequate  Airway patency: patent  YOHANNES mitigation strategies: Multimodal analgesia  PONV status at discharge: No PONV  Anesthetic complications: no      Cardiovascular status: hemodynamically stable and blood pressure returned to baseline  Respiratory status: intubated and ventilator  Hydration status: euvolemic  Follow-up not needed.          Vitals Value Taken Time   /72 06/23/22 2016   Temp  06/23/22 2021   Pulse 110 06/23/22 2020   Resp 27 06/23/22 2020   SpO2 98 % 06/23/22 2020   Vitals shown include unvalidated device data.      No case tracking events are documented in the log.      Pain/Jazmyne Score: Pain Rating Prior to Med Admin: 0 (6/23/2022 12:32 PM)

## 2022-06-24 NOTE — PLAN OF CARE
SW contacted patient's brother, Milad Ortiz ([phone]). The following information was obtained;     1. Patient is  with no children. Patient's wife is aPtience Rivas ([phone]). Patient's spouse is Albanian speaking only.  2. Patient resides in [facility] and was assigned work for 10 months in the [facility] working with [employer] located at [address] [place] [address]. SW will attempt to make contact with patient's spouse next week.
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
## 2022-06-25 LAB
ALBUMIN SERPL-MCNC: 2.8 GM/DL (ref 3.5–5)
ALBUMIN/GLOB SERPL: 1.1 RATIO (ref 1.1–2)
ALP SERPL-CCNC: 75 UNIT/L (ref 40–150)
ALT SERPL-CCNC: 36 UNIT/L (ref 0–55)
AST SERPL-CCNC: 38 UNIT/L (ref 5–34)
BASOPHILS # BLD AUTO: 0.05 X10(3)/MCL (ref 0–0.2)
BASOPHILS NFR BLD AUTO: 0.3 %
BILIRUBIN DIRECT+TOT PNL SERPL-MCNC: 0.8 MG/DL
BUN SERPL-MCNC: 8.8 MG/DL (ref 8.9–20.6)
CALCIUM SERPL-MCNC: 8 MG/DL (ref 8.4–10.2)
CHLORIDE SERPL-SCNC: 114 MMOL/L (ref 98–107)
CO2 SERPL-SCNC: 26 MMOL/L (ref 22–29)
CREAT SERPL-MCNC: 0.83 MG/DL (ref 0.73–1.18)
EOSINOPHIL # BLD AUTO: 0 X10(3)/MCL (ref 0–0.9)
EOSINOPHIL NFR BLD AUTO: 0 %
ERYTHROCYTE [DISTWIDTH] IN BLOOD BY AUTOMATED COUNT: 15.3 % (ref 11.5–17)
GLOBULIN SER-MCNC: 2.5 GM/DL (ref 2.4–3.5)
GLUCOSE SERPL-MCNC: 167 MG/DL (ref 74–100)
HCT VFR BLD AUTO: 26.9 % (ref 42–52)
HGB BLD-MCNC: 8.6 GM/DL (ref 14–18)
IMM GRANULOCYTES # BLD AUTO: 0.24 X10(3)/MCL (ref 0–0.02)
IMM GRANULOCYTES NFR BLD AUTO: 1.3 % (ref 0–0.43)
LYMPHOCYTES # BLD AUTO: 1.19 X10(3)/MCL (ref 0.6–4.6)
LYMPHOCYTES NFR BLD AUTO: 6.5 %
MAGNESIUM SERPL-MCNC: 2.1 MG/DL (ref 1.6–2.6)
MCH RBC QN AUTO: 28.9 PG (ref 27–31)
MCHC RBC AUTO-ENTMCNC: 32 MG/DL (ref 33–36)
MCV RBC AUTO: 90.3 FL (ref 80–94)
MONOCYTES # BLD AUTO: 1.75 X10(3)/MCL (ref 0.1–1.3)
MONOCYTES NFR BLD AUTO: 9.5 %
NEUTROPHILS # BLD AUTO: 15.2 X10(3)/MCL (ref 2.1–9.2)
NEUTROPHILS NFR BLD AUTO: 82.4 %
NRBC BLD AUTO-RTO: 0 %
PHOSPHATE SERPL-MCNC: 1.8 MG/DL (ref 2.3–4.7)
PLATELET # BLD AUTO: 140 X10(3)/MCL (ref 130–400)
PMV BLD AUTO: 10.5 FL (ref 9.4–12.4)
POCT GLUCOSE: 121 MG/DL (ref 70–110)
POCT GLUCOSE: 135 MG/DL (ref 70–110)
POCT GLUCOSE: 147 MG/DL (ref 70–110)
POTASSIUM SERPL-SCNC: 4.1 MMOL/L (ref 3.5–5.1)
PROT SERPL-MCNC: 5.3 GM/DL (ref 6.4–8.3)
RBC # BLD AUTO: 2.98 X10(6)/MCL (ref 4.7–6.1)
SODIUM SERPL-SCNC: 144 MMOL/L (ref 136–145)
WBC # SPEC AUTO: 18.4 X10(3)/MCL (ref 4.5–11.5)

## 2022-06-25 PROCEDURE — 36415 COLL VENOUS BLD VENIPUNCTURE: CPT | Performed by: STUDENT IN AN ORGANIZED HEALTH CARE EDUCATION/TRAINING PROGRAM

## 2022-06-25 PROCEDURE — 20800000 HC ICU TRAUMA

## 2022-06-25 PROCEDURE — 94003 VENT MGMT INPAT SUBQ DAY: CPT

## 2022-06-25 PROCEDURE — 63600175 PHARM REV CODE 636 W HCPCS: Performed by: STUDENT IN AN ORGANIZED HEALTH CARE EDUCATION/TRAINING PROGRAM

## 2022-06-25 PROCEDURE — 27200966 HC CLOSED SUCTION SYSTEM

## 2022-06-25 PROCEDURE — 99900026 HC AIRWAY MAINTENANCE (STAT)

## 2022-06-25 PROCEDURE — 27000221 HC OXYGEN, UP TO 24 HOURS

## 2022-06-25 PROCEDURE — C9113 INJ PANTOPRAZOLE SODIUM, VIA: HCPCS | Performed by: STUDENT IN AN ORGANIZED HEALTH CARE EDUCATION/TRAINING PROGRAM

## 2022-06-25 PROCEDURE — 83735 ASSAY OF MAGNESIUM: CPT | Performed by: STUDENT IN AN ORGANIZED HEALTH CARE EDUCATION/TRAINING PROGRAM

## 2022-06-25 PROCEDURE — 25000003 PHARM REV CODE 250: Performed by: STUDENT IN AN ORGANIZED HEALTH CARE EDUCATION/TRAINING PROGRAM

## 2022-06-25 PROCEDURE — 25000003 PHARM REV CODE 250: Performed by: NURSE PRACTITIONER

## 2022-06-25 PROCEDURE — 94761 N-INVAS EAR/PLS OXIMETRY MLT: CPT

## 2022-06-25 PROCEDURE — 80053 COMPREHEN METABOLIC PANEL: CPT | Performed by: STUDENT IN AN ORGANIZED HEALTH CARE EDUCATION/TRAINING PROGRAM

## 2022-06-25 PROCEDURE — 85025 COMPLETE CBC W/AUTO DIFF WBC: CPT | Performed by: STUDENT IN AN ORGANIZED HEALTH CARE EDUCATION/TRAINING PROGRAM

## 2022-06-25 PROCEDURE — 84100 ASSAY OF PHOSPHORUS: CPT | Performed by: STUDENT IN AN ORGANIZED HEALTH CARE EDUCATION/TRAINING PROGRAM

## 2022-06-25 PROCEDURE — 99900035 HC TECH TIME PER 15 MIN (STAT)

## 2022-06-25 PROCEDURE — 63600175 PHARM REV CODE 636 W HCPCS: Performed by: NURSE PRACTITIONER

## 2022-06-25 RX ADMIN — PROPOFOL 30 MCG/KG/MIN: 10 INJECTION, EMULSION INTRAVENOUS at 05:06

## 2022-06-25 RX ADMIN — ENALAPRILAT 1.25 MG: 2.5 INJECTION INTRAVENOUS at 07:06

## 2022-06-25 RX ADMIN — PROPOFOL 30 MCG/KG/MIN: 10 INJECTION, EMULSION INTRAVENOUS at 11:06

## 2022-06-25 RX ADMIN — PANTOPRAZOLE SODIUM 40 MG: 40 INJECTION, POWDER, FOR SOLUTION INTRAVENOUS at 09:06

## 2022-06-25 RX ADMIN — Medication 75 MCG/HR: at 06:06

## 2022-06-25 RX ADMIN — SODIUM CHLORIDE: 9 INJECTION, SOLUTION INTRAVENOUS at 03:06

## 2022-06-25 RX ADMIN — PIPERACILLIN SODIUM AND TAZOBACTAM SODIUM 4.5 G: 4; .5 INJECTION, POWDER, LYOPHILIZED, FOR SOLUTION INTRAVENOUS at 08:06

## 2022-06-25 RX ADMIN — VANCOMYCIN HYDROCHLORIDE 1500 MG: 1.5 INJECTION, POWDER, LYOPHILIZED, FOR SOLUTION INTRAVENOUS at 07:06

## 2022-06-25 RX ADMIN — PIPERACILLIN SODIUM AND TAZOBACTAM SODIUM 4.5 G: 4; .5 INJECTION, POWDER, LYOPHILIZED, FOR SOLUTION INTRAVENOUS at 03:06

## 2022-06-25 RX ADMIN — LEVETIRACETAM 500 MG: 100 INJECTION, SOLUTION INTRAVENOUS at 08:06

## 2022-06-25 RX ADMIN — PROPOFOL 30 MCG/KG/MIN: 10 INJECTION, EMULSION INTRAVENOUS at 06:06

## 2022-06-25 RX ADMIN — PIPERACILLIN SODIUM AND TAZOBACTAM SODIUM 4.5 G: 4; .5 INJECTION, POWDER, LYOPHILIZED, FOR SOLUTION INTRAVENOUS at 12:06

## 2022-06-25 RX ADMIN — VANCOMYCIN HYDROCHLORIDE 1500 MG: 1.5 INJECTION, POWDER, LYOPHILIZED, FOR SOLUTION INTRAVENOUS at 11:06

## 2022-06-25 RX ADMIN — VANCOMYCIN HYDROCHLORIDE 1500 MG: 1.5 INJECTION, POWDER, LYOPHILIZED, FOR SOLUTION INTRAVENOUS at 03:06

## 2022-06-25 RX ADMIN — LEVETIRACETAM 500 MG: 100 INJECTION, SOLUTION INTRAVENOUS at 09:06
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
## 2022-06-26 LAB
ABO + RH BLD: NORMAL
ABO + RH BLD: NORMAL
ALBUMIN SERPL-MCNC: 2.6 GM/DL (ref 3.5–5)
ALBUMIN/GLOB SERPL: 0.9 RATIO (ref 1.1–2)
ALP SERPL-CCNC: 85 UNIT/L (ref 40–150)
ALT SERPL-CCNC: 35 UNIT/L (ref 0–55)
AST SERPL-CCNC: 45 UNIT/L (ref 5–34)
BACTERIA SPT CULT: ABNORMAL
BASOPHILS # BLD AUTO: 0.05 X10(3)/MCL (ref 0–0.2)
BASOPHILS NFR BLD AUTO: 0.4 %
BILIRUBIN DIRECT+TOT PNL SERPL-MCNC: 1 MG/DL
BLD PROD TYP BPU: NORMAL
BLD PROD TYP BPU: NORMAL
BLOOD UNIT EXPIRATION DATE: NORMAL
BLOOD UNIT EXPIRATION DATE: NORMAL
BLOOD UNIT TYPE CODE: 5100
BLOOD UNIT TYPE CODE: 5100
BUN SERPL-MCNC: 14.5 MG/DL (ref 8.9–20.6)
CALCIUM SERPL-MCNC: 7.5 MG/DL (ref 8.4–10.2)
CHLORIDE SERPL-SCNC: 104 MMOL/L (ref 98–107)
CO2 SERPL-SCNC: 23 MMOL/L (ref 22–29)
CORRECTED TEMPERATURE (PCO2): 43 MMHG (ref 35–45)
CORRECTED TEMPERATURE (PH): 7.41 (ref 7.35–7.45)
CORRECTED TEMPERATURE (PO2): 126 MMHG (ref 80–100)
CREAT SERPL-MCNC: 1.25 MG/DL (ref 0.73–1.18)
CROSSMATCH INTERPRETATION: NORMAL
CROSSMATCH INTERPRETATION: NORMAL
DISPENSE STATUS: NORMAL
DISPENSE STATUS: NORMAL
EOSINOPHIL # BLD AUTO: 0.02 X10(3)/MCL (ref 0–0.9)
EOSINOPHIL NFR BLD AUTO: 0.2 %
ERYTHROCYTE [DISTWIDTH] IN BLOOD BY AUTOMATED COUNT: 15.9 % (ref 11.5–17)
GLOBULIN SER-MCNC: 2.8 GM/DL (ref 2.4–3.5)
GLUCOSE SERPL-MCNC: 510 MG/DL (ref 74–100)
GRAM STN SPEC: ABNORMAL
HCO3 UR-SCNC: 27.3 MMOL/L
HCT VFR BLD AUTO: 23.8 % (ref 42–52)
HGB BLD-MCNC: 7.3 GM/DL (ref 14–18)
HGB BLD-MCNC: 8.5 G/DL (ref 12–16)
IMM GRANULOCYTES # BLD AUTO: 0.19 X10(3)/MCL (ref 0–0.02)
IMM GRANULOCYTES NFR BLD AUTO: 1.4 % (ref 0–0.43)
LYMPHOCYTES # BLD AUTO: 1.34 X10(3)/MCL (ref 0.6–4.6)
LYMPHOCYTES NFR BLD AUTO: 10.2 %
MAGNESIUM SERPL-MCNC: 2.25 MG/DL (ref 1.6–2.6)
MCH RBC QN AUTO: 28.6 PG (ref 27–31)
MCHC RBC AUTO-ENTMCNC: 30.7 MG/DL (ref 33–36)
MCV RBC AUTO: 93.3 FL (ref 80–94)
MONOCYTES # BLD AUTO: 1.07 X10(3)/MCL (ref 0.1–1.3)
MONOCYTES NFR BLD AUTO: 8.1 %
NEUTROPHILS # BLD AUTO: 10.5 X10(3)/MCL (ref 2.1–9.2)
NEUTROPHILS NFR BLD AUTO: 79.7 %
NRBC BLD AUTO-RTO: 0 %
PCO2 BLDA: 43 MMHG (ref 35–45)
PH SMN: 7.41 [PH] (ref 7.35–7.45)
PHOSPHATE SERPL-MCNC: 2.6 MG/DL (ref 2.3–4.7)
PLATELET # BLD AUTO: 145 X10(3)/MCL (ref 130–400)
PMV BLD AUTO: 10.1 FL (ref 9.4–12.4)
PO2 BLDA: 126 MMHG (ref 80–100)
POC BASE DEFICIT: 2.4 MMOL/L (ref -2–2)
POC COHB: 2.2 %
POC IONIZED CALCIUM: 1.2 MMOL/L (ref 1.12–1.23)
POC METHB: 0.8 % (ref 0.4–2)
POC O2HB: 97.4 % (ref 94–97)
POC SATURATED O2: 98.9 %
POC TEMPERATURE: 37 °C
POCT GLUCOSE: 110 MG/DL (ref 70–110)
POCT GLUCOSE: 112 MG/DL (ref 70–110)
POTASSIUM BLD-SCNC: 3.9 MMOL/L (ref 3.5–5)
POTASSIUM SERPL-SCNC: 3.4 MMOL/L (ref 3.5–5.1)
PROT SERPL-MCNC: 5.4 GM/DL (ref 6.4–8.3)
RBC # BLD AUTO: 2.55 X10(6)/MCL (ref 4.7–6.1)
SODIUM BLD-SCNC: 145 MMOL/L (ref 137–145)
SODIUM SERPL-SCNC: 142 MMOL/L (ref 136–145)
SPECIMEN SOURCE: ABNORMAL
UNIT NUMBER: NORMAL
UNIT NUMBER: NORMAL
VANCOMYCIN TROUGH SERPL-MCNC: 25.9 UG/ML (ref 15–20)
WBC # SPEC AUTO: 13.1 X10(3)/MCL (ref 4.5–11.5)

## 2022-06-26 PROCEDURE — 83735 ASSAY OF MAGNESIUM: CPT | Performed by: STUDENT IN AN ORGANIZED HEALTH CARE EDUCATION/TRAINING PROGRAM

## 2022-06-26 PROCEDURE — 99900035 HC TECH TIME PER 15 MIN (STAT)

## 2022-06-26 PROCEDURE — 94003 VENT MGMT INPAT SUBQ DAY: CPT

## 2022-06-26 PROCEDURE — 36430 TRANSFUSION BLD/BLD COMPNT: CPT

## 2022-06-26 PROCEDURE — P9016 RBC LEUKOCYTES REDUCED: HCPCS | Performed by: STUDENT IN AN ORGANIZED HEALTH CARE EDUCATION/TRAINING PROGRAM

## 2022-06-26 PROCEDURE — 80053 COMPREHEN METABOLIC PANEL: CPT | Performed by: STUDENT IN AN ORGANIZED HEALTH CARE EDUCATION/TRAINING PROGRAM

## 2022-06-26 PROCEDURE — 63600175 PHARM REV CODE 636 W HCPCS: Performed by: STUDENT IN AN ORGANIZED HEALTH CARE EDUCATION/TRAINING PROGRAM

## 2022-06-26 PROCEDURE — 85025 COMPLETE CBC W/AUTO DIFF WBC: CPT | Performed by: STUDENT IN AN ORGANIZED HEALTH CARE EDUCATION/TRAINING PROGRAM

## 2022-06-26 PROCEDURE — 27200966 HC CLOSED SUCTION SYSTEM

## 2022-06-26 PROCEDURE — 99900026 HC AIRWAY MAINTENANCE (STAT)

## 2022-06-26 PROCEDURE — 27000221 HC OXYGEN, UP TO 24 HOURS

## 2022-06-26 PROCEDURE — 36415 COLL VENOUS BLD VENIPUNCTURE: CPT | Performed by: STUDENT IN AN ORGANIZED HEALTH CARE EDUCATION/TRAINING PROGRAM

## 2022-06-26 PROCEDURE — 25000003 PHARM REV CODE 250: Performed by: NURSE PRACTITIONER

## 2022-06-26 PROCEDURE — C9113 INJ PANTOPRAZOLE SODIUM, VIA: HCPCS | Performed by: STUDENT IN AN ORGANIZED HEALTH CARE EDUCATION/TRAINING PROGRAM

## 2022-06-26 PROCEDURE — 84100 ASSAY OF PHOSPHORUS: CPT | Performed by: STUDENT IN AN ORGANIZED HEALTH CARE EDUCATION/TRAINING PROGRAM

## 2022-06-26 PROCEDURE — 20800000 HC ICU TRAUMA

## 2022-06-26 PROCEDURE — 80202 ASSAY OF VANCOMYCIN: CPT | Performed by: SURGERY

## 2022-06-26 PROCEDURE — 94761 N-INVAS EAR/PLS OXIMETRY MLT: CPT

## 2022-06-26 PROCEDURE — 25000003 PHARM REV CODE 250: Performed by: STUDENT IN AN ORGANIZED HEALTH CARE EDUCATION/TRAINING PROGRAM

## 2022-06-26 PROCEDURE — 63600175 PHARM REV CODE 636 W HCPCS: Performed by: NURSE PRACTITIONER

## 2022-06-26 RX ORDER — HYDROCODONE BITARTRATE AND ACETAMINOPHEN 500; 5 MG/1; MG/1
TABLET ORAL
Status: DISCONTINUED | OUTPATIENT
Start: 2022-06-26 | End: 2022-07-11 | Stop reason: HOSPADM

## 2022-06-26 RX ORDER — VANCOMYCIN HCL IN 5 % DEXTROSE 1G/250ML
1000 PLASTIC BAG, INJECTION (ML) INTRAVENOUS
Status: DISCONTINUED | OUTPATIENT
Start: 2022-06-27 | End: 2022-06-29

## 2022-06-26 RX ORDER — POTASSIUM CHLORIDE 14.9 MG/ML
20 INJECTION INTRAVENOUS
Status: DISPENSED | OUTPATIENT
Start: 2022-06-26 | End: 2022-06-26

## 2022-06-26 RX ADMIN — LEVETIRACETAM 500 MG: 100 INJECTION, SOLUTION INTRAVENOUS at 08:06

## 2022-06-26 RX ADMIN — LEVETIRACETAM 500 MG: 100 INJECTION, SOLUTION INTRAVENOUS at 09:06

## 2022-06-26 RX ADMIN — PANTOPRAZOLE SODIUM 40 MG: 40 INJECTION, POWDER, FOR SOLUTION INTRAVENOUS at 09:06

## 2022-06-26 RX ADMIN — POTASSIUM CHLORIDE 20 MEQ: 200 INJECTION, SOLUTION INTRAVENOUS at 05:06

## 2022-06-26 RX ADMIN — VANCOMYCIN HYDROCHLORIDE 1500 MG: 1.5 INJECTION, POWDER, LYOPHILIZED, FOR SOLUTION INTRAVENOUS at 08:06

## 2022-06-26 RX ADMIN — PROPOFOL 30 MCG/KG/MIN: 10 INJECTION, EMULSION INTRAVENOUS at 07:06

## 2022-06-26 RX ADMIN — PIPERACILLIN SODIUM AND TAZOBACTAM SODIUM 4.5 G: 4; .5 INJECTION, POWDER, LYOPHILIZED, FOR SOLUTION INTRAVENOUS at 11:06

## 2022-06-26 RX ADMIN — PIPERACILLIN SODIUM AND TAZOBACTAM SODIUM 4.5 G: 4; .5 INJECTION, POWDER, LYOPHILIZED, FOR SOLUTION INTRAVENOUS at 05:06

## 2022-06-26 RX ADMIN — PIPERACILLIN SODIUM AND TAZOBACTAM SODIUM 4.5 G: 4; .5 INJECTION, POWDER, LYOPHILIZED, FOR SOLUTION INTRAVENOUS at 08:06

## 2022-06-26 RX ADMIN — POTASSIUM CHLORIDE 20 MEQ: 200 INJECTION, SOLUTION INTRAVENOUS at 08:06
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
## 2022-06-26 NOTE — PROGRESS NOTES
Pharmacokinetic Assessment Follow Up: IV Vancomycin    Vancomycin serum concentration assessment(s):    The trough level was drawn correctly and can be used to guide therapy at this time. The measurement is above the desired definitive target range of 15 to 20 mcg/mL.    Vancomycin Regimen Plan:  Patient was on Vancomycin 1500 mg IV every 8 hours  Will hold next dose, then   Change regimen to Vancomycin 1000 mg IV every 8 hours with next serum trough concentration measured at 0300 prior to 4th dose on 6/28    Drug levels (last 3 results):  Recent Labs   Lab Result Units 06/24/22  1738 06/26/22  1327   Vancomycin Trough ug/ml 15.2 25.9*       Pharmacy will continue to follow and monitor vancomycin.    Please contact pharmacy at extension [phone] for questions regarding this assessment.    Thank you for the consult,   Polo Gallagher       Patient brief summary:  Alexander Ortiz is a 46 y.o. male initiated on antimicrobial therapy with IV Vancomycin for treatment of skin & soft tissue infection    The patient's current regimen is 1000 mg IV vancomycin every 8 hours    Drug Allergies:   Review of patient's allergies indicates:  No Known Allergies    Actual Body Weight:   120 kg    Renal Function:   Estimated Creatinine Clearance: 96.4 mL/min (A) (based on SCr of 1.25 mg/dL (H)).,     Dialysis Method (if applicable):  N/A    CBC (last 72 hours):  Recent Labs   Lab Result Units 06/24/22  0251 06/25/22  0030 06/26/22  0253   WBC x10(3)/mcL 22.7* 18.4* 13.1*   Hgb gm/dL 10.2* 8.6* 7.3*   Hct % 31.0* 26.9* 23.8*   Platelet x10(3)/mcL 177 140 145   Mono % % 5.9 9.5 8.1   Eos % % 0.3 0.0 0.2   Basophil % % 0.3 0.3 0.4       Metabolic Panel (last 72 hours):  Recent Labs   Lab Result Units 06/24/22  0251 06/25/22  0030 06/26/22  1327   Sodium Level mmol/L 142 144 142   Potassium Level mmol/L 4.3 4.1 3.4*   Chloride mmol/L 112* 114* 104   Carbon Dioxide mmol/L 24 26 23   Glucose Level mg/dL 176* 167* 510*   Blood Urea Nitrogen mg/dL  9.7 8.8* 14.5   Creatinine mg/dL 0.78 0.83 1.25*   Albumin Level gm/dL 2.7* 2.8* 2.6*   Bilirubin Total mg/dL 0.8 0.8 1.0   Alkaline Phosphatase unit/L 83 75 85   Aspartate Aminotransferase unit/L 33 38* 45*   Alanine Aminotransferase unit/L 43 36 35   Magnesium Level mg/dL 1.90 2.10  --    Phosphorus Level mg/dL 2.1* 1.8*  --        Vancomycin Administrations:  vancomycin given in the last 96 hours                     vancomycin 1.5 g in dextrose 5 % 250 mL IVPB (ready to mix) (mg) 1,500 mg New Bag 06/26/22 0830     1,500 mg New Bag 06/25/22 1940     1,500 mg New Bag  1107     1,500 mg New Bag  0313     1,500 mg New Bag 06/24/22 1812     1,500 mg New Bag  1038     1,500 mg New Bag  0436     1,500 mg New Bag 06/23/22 2030                    Microbiologic Results:  Microbiology Results (last 7 days)       Procedure Component Value Units Date/Time    Blood Culture [[id number]]  (Normal) Collected: 06/24/22 1022    Order Status: Completed Specimen: Blood from Arm, Left Updated: 06/26/22 1200     CULTURE, BLOOD ([facility]) No Growth At 48 Hours    Respiratory Culture [[id number]]  (Abnormal) Collected: 06/24/22 1034    Order Status: Completed Specimen: Respiratory from Endotracheal Aspirate Updated: 06/26/22 0645     Respiratory Culture Moderate Bacillus species     GRAM STAIN Quality 3+      Moderate Gram Positive Rods      Few Gram positive cocci    Narrative:      With no other respiratory izabela     Blood Culture [[id number]] Collected: 06/24/22 1047    Order Status: Sent Specimen: Blood from Arm, Right Updated: 06/24/22 1047

## 2022-06-27 LAB
ALBUMIN SERPL-MCNC: 2.7 GM/DL (ref 3.5–5)
ALBUMIN/GLOB SERPL: 0.9 RATIO (ref 1.1–2)
ALP SERPL-CCNC: 92 UNIT/L (ref 40–150)
ALT SERPL-CCNC: 52 UNIT/L (ref 0–55)
AST SERPL-CCNC: 77 UNIT/L (ref 5–34)
BASOPHILS # BLD AUTO: 0.06 X10(3)/MCL (ref 0–0.2)
BASOPHILS NFR BLD AUTO: 0.6 %
BILIRUBIN DIRECT+TOT PNL SERPL-MCNC: 1.3 MG/DL
BUN SERPL-MCNC: 16.7 MG/DL (ref 8.9–20.6)
CALCIUM SERPL-MCNC: 8.6 MG/DL (ref 8.4–10.2)
CHLORIDE SERPL-SCNC: 116 MMOL/L (ref 98–107)
CO2 SERPL-SCNC: 23 MMOL/L (ref 22–29)
CREAT SERPL-MCNC: 1.16 MG/DL (ref 0.73–1.18)
EOSINOPHIL # BLD AUTO: 0.08 X10(3)/MCL (ref 0–0.9)
EOSINOPHIL NFR BLD AUTO: 0.8 %
ERYTHROCYTE [DISTWIDTH] IN BLOOD BY AUTOMATED COUNT: 15.1 % (ref 11.5–17)
GLOBULIN SER-MCNC: 3.1 GM/DL (ref 2.4–3.5)
GLUCOSE SERPL-MCNC: 126 MG/DL (ref 74–100)
HCT VFR BLD AUTO: 28.5 % (ref 42–52)
HGB BLD-MCNC: 9.1 GM/DL (ref 14–18)
IMM GRANULOCYTES # BLD AUTO: 0.11 X10(3)/MCL (ref 0–0.02)
IMM GRANULOCYTES NFR BLD AUTO: 1 % (ref 0–0.43)
LYMPHOCYTES # BLD AUTO: 1.04 X10(3)/MCL (ref 0.6–4.6)
LYMPHOCYTES NFR BLD AUTO: 9.9 %
MAGNESIUM SERPL-MCNC: 2.3 MG/DL (ref 1.6–2.6)
MCH RBC QN AUTO: 29 PG (ref 27–31)
MCHC RBC AUTO-ENTMCNC: 31.9 MG/DL (ref 33–36)
MCV RBC AUTO: 90.8 FL (ref 80–94)
MONOCYTES # BLD AUTO: 0.83 X10(3)/MCL (ref 0.1–1.3)
MONOCYTES NFR BLD AUTO: 7.9 %
NEUTROPHILS # BLD AUTO: 8.4 X10(3)/MCL (ref 2.1–9.2)
NEUTROPHILS NFR BLD AUTO: 79.8 %
NRBC BLD AUTO-RTO: 0 %
PHOSPHATE SERPL-MCNC: 3.4 MG/DL (ref 2.3–4.7)
PLATELET # BLD AUTO: 135 X10(3)/MCL (ref 130–400)
PMV BLD AUTO: 10.1 FL (ref 9.4–12.4)
POCT GLUCOSE: 120 MG/DL (ref 70–110)
POCT GLUCOSE: 125 MG/DL (ref 70–110)
POTASSIUM SERPL-SCNC: 4 MMOL/L (ref 3.5–5.1)
PROT SERPL-MCNC: 5.8 GM/DL (ref 6.4–8.3)
RBC # BLD AUTO: 3.14 X10(6)/MCL (ref 4.7–6.1)
SODIUM SERPL-SCNC: 148 MMOL/L (ref 136–145)
WBC # SPEC AUTO: 10.5 X10(3)/MCL (ref 4.5–11.5)

## 2022-06-27 PROCEDURE — 25000003 PHARM REV CODE 250: Performed by: STUDENT IN AN ORGANIZED HEALTH CARE EDUCATION/TRAINING PROGRAM

## 2022-06-27 PROCEDURE — 92610 EVALUATE SWALLOWING FUNCTION: CPT

## 2022-06-27 PROCEDURE — 25000003 PHARM REV CODE 250: Performed by: NURSE PRACTITIONER

## 2022-06-27 PROCEDURE — 80053 COMPREHEN METABOLIC PANEL: CPT | Performed by: STUDENT IN AN ORGANIZED HEALTH CARE EDUCATION/TRAINING PROGRAM

## 2022-06-27 PROCEDURE — 83735 ASSAY OF MAGNESIUM: CPT | Performed by: STUDENT IN AN ORGANIZED HEALTH CARE EDUCATION/TRAINING PROGRAM

## 2022-06-27 PROCEDURE — 36415 COLL VENOUS BLD VENIPUNCTURE: CPT | Performed by: STUDENT IN AN ORGANIZED HEALTH CARE EDUCATION/TRAINING PROGRAM

## 2022-06-27 PROCEDURE — 63600175 PHARM REV CODE 636 W HCPCS: Performed by: NURSE PRACTITIONER

## 2022-06-27 PROCEDURE — 84100 ASSAY OF PHOSPHORUS: CPT | Performed by: STUDENT IN AN ORGANIZED HEALTH CARE EDUCATION/TRAINING PROGRAM

## 2022-06-27 PROCEDURE — 63600175 PHARM REV CODE 636 W HCPCS: Performed by: STUDENT IN AN ORGANIZED HEALTH CARE EDUCATION/TRAINING PROGRAM

## 2022-06-27 PROCEDURE — 85025 COMPLETE CBC W/AUTO DIFF WBC: CPT | Performed by: STUDENT IN AN ORGANIZED HEALTH CARE EDUCATION/TRAINING PROGRAM

## 2022-06-27 PROCEDURE — 63600175 PHARM REV CODE 636 W HCPCS: Performed by: SURGERY

## 2022-06-27 PROCEDURE — C9113 INJ PANTOPRAZOLE SODIUM, VIA: HCPCS | Performed by: STUDENT IN AN ORGANIZED HEALTH CARE EDUCATION/TRAINING PROGRAM

## 2022-06-27 PROCEDURE — 20800000 HC ICU TRAUMA

## 2022-06-27 RX ADMIN — SODIUM CHLORIDE: 9 INJECTION, SOLUTION INTRAVENOUS at 04:06

## 2022-06-27 RX ADMIN — PIPERACILLIN SODIUM AND TAZOBACTAM SODIUM 4.5 G: 4; .5 INJECTION, POWDER, LYOPHILIZED, FOR SOLUTION INTRAVENOUS at 07:06

## 2022-06-27 RX ADMIN — SODIUM CHLORIDE: 9 INJECTION, SOLUTION INTRAVENOUS at 07:06

## 2022-06-27 RX ADMIN — LEVETIRACETAM 500 MG: 100 INJECTION, SOLUTION INTRAVENOUS at 09:06

## 2022-06-27 RX ADMIN — PIPERACILLIN SODIUM AND TAZOBACTAM SODIUM 4.5 G: 4; .5 INJECTION, POWDER, LYOPHILIZED, FOR SOLUTION INTRAVENOUS at 04:06

## 2022-06-27 RX ADMIN — VANCOMYCIN HYDROCHLORIDE 1000 MG: 1 INJECTION, POWDER, LYOPHILIZED, FOR SOLUTION INTRAVENOUS at 12:06

## 2022-06-27 RX ADMIN — VANCOMYCIN HYDROCHLORIDE 1000 MG: 1 INJECTION, POWDER, LYOPHILIZED, FOR SOLUTION INTRAVENOUS at 08:06

## 2022-06-27 RX ADMIN — PANTOPRAZOLE SODIUM 40 MG: 40 INJECTION, POWDER, FOR SOLUTION INTRAVENOUS at 09:06

## 2022-06-27 RX ADMIN — LEVETIRACETAM 500 MG: 100 INJECTION, SOLUTION INTRAVENOUS at 08:06

## 2022-06-27 RX ADMIN — VANCOMYCIN HYDROCHLORIDE 1000 MG: 1 INJECTION, POWDER, LYOPHILIZED, FOR SOLUTION INTRAVENOUS at 04:06
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
## 2022-06-27 NOTE — PLAN OF CARE
Problem: Adult Inpatient Plan of Care  Goal: Plan of Care Review  Outcome: Ongoing, Progressing  Goal: Patient-Specific Goal (Individualized)  Outcome: Ongoing, Progressing  Goal: Absence of Hospital-Acquired Illness or Injury  Outcome: Ongoing, Progressing  Goal: Optimal Comfort and Wellbeing  Outcome: Ongoing, Progressing  Goal: Readiness for Transition of Care  Outcome: Ongoing, Progressing     Problem: Infection  Goal: Absence of Infection Signs and Symptoms  Outcome: Ongoing, Progressing     Problem: Impaired Wound Healing  Goal: Optimal Wound Healing  Outcome: Ongoing, Progressing     Problem: Skin Injury Risk Increased  Goal: Skin Health and Integrity  Outcome: Ongoing, Progressing     Problem: Communication Impairment (Mechanical Ventilation, Invasive)  Goal: Effective Communication  Outcome: Ongoing, Progressing     Problem: Device-Related Complication Risk (Mechanical Ventilation, Invasive)  Goal: Optimal Device Function  Outcome: Ongoing, Progressing     Problem: Communication Impairment (Artificial Airway)  Goal: Effective Communication  Outcome: Ongoing, Progressing     Problem: Fall Injury Risk  Goal: Absence of Fall and Fall-Related Injury  Outcome: Ongoing, Progressing
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
## 2022-06-27 NOTE — PT/OT/SLP EVAL
"Speech Language Pathology Evaluation  Bedside Swallow    Patient Name:  Alexander Ortiz   MRN:  [id number]  Admitting Diagnosis: trauma, bifrontal craniotomy, ORIF frontal sinus, pulmonary embolism    Recommendations:                 General Recommendations:  Modified barium swallow study  Diet recommendations:  NPO, NPO ok for meds crushed in pudding  Aspiration Precautions: HOB to 90 degrees   General Precautions: Standard,    Communication strategies:  provide increased time to answer    History:     History reviewed. No pertinent past medical history.    History reviewed. No pertinent surgical history.    Prior diet: regular solids, thin liquids.        Subjective       Patient goals: "Can I have more ice?"     Pain/Comfort:  · Pain Rating 1: 0/10    Respiratory Status: nasal cannula    ENT clearance obtained for swallow evaluation and chewable solids as tolerated.    Objective:     Oral Musculature Evaluation  · Oral Musculature: WFL  · Dentition: present and adequate  · Voice Prior to PO Intake: adequate    Bedside Swallow Eval:   Consistencies Assessed:  · Ice chips  · puree     Oral Phase:   · bolus holding    Pharyngeal Phase:   · inconsistent throat clear throughout exam.  Nursing reports pt demonstrating throat clears intermittently this AM.    Compensatory Strategies  · None        Assessment:     Alexander Ortiz is a 46 y.o. male with an SLP diagnosis of possible oropharygneal dysphagia..  Rec: NPO, ok for meds crushed in pudding.  SLP to proceed with MBS to assess current swallow function.    Goals:   Multidisciplinary Problems     SLP Goals     Not on file                Plan:     · Patient to be seen:      · Plan of Care expires:     · Plan of Care reviewed with:  patient   · SLP Follow-Up:          Discharge recommendations:      Barriers to Discharge:  acuity of illness    Time Tracking:     SLP Treatment Date:   06/27/22  Speech Start Time:  0930  Speech Stop Time:  0950     Speech Total Time (min): "  20 min    Billable Minutes: Eval Swallow and Oral Function 20 minutes    06/27/2022

## 2022-06-27 NOTE — PROGRESS NOTES
[facility] - [address] [place] [address]  Neurosurgery  Progress Note    Subjective:     Interval History:  Extubated.  Impulsive. Left hand/wrist splint in place.      Post-Op Info:  Procedure(s) (LRB):  CRANIOTOMY (N/A)  DEBRIDEMENT-SINUS (Bilateral)   4 Days Post-Op      Medications:  Continuous Infusions:   sodium chloride 0.9% 150 mL/hr at 06/27/22 0732    clevidipine Stopped (06/23/22 1233)    fentanyl Stopped (06/26/22 1600)    NORepinephrine bitartrate-D5W      propofoL Stopped (06/26/22 0800)     Scheduled Meds:   levetiracetam IV  500 mg Intravenous Q12H    pantoprazole  40 mg Intravenous Daily    piperacillin-tazobactam (ZOSYN) IVPB  4.5 g Intravenous Q8H    vancomycin (VANCOCIN) IVPB  1,000 mg Intravenous Q8H     PRN Meds:sodium chloride, dextrose 10%, dextrose 10%, enalaprilat, glucagon (human recombinant), hydrALAZINE, iopamidoL, sodium chloride 0.9%, Pharmacy to dose Vancomycin consult **AND** vancomycin - pharmacy to dose     Review of Systems  Objective:     Weight: 120 kg (264 lb 8.8 oz)  Body mass index is 39.05 kg/m².  Vital Signs (Most Recent):  Temp: 98.5 °F (36.9 °C) (06/27/22 0400)  Pulse: 60 (06/27/22 0500)  Resp: (!) 25 (06/27/22 0500)  BP:  (pt went down for CT) (06/27/22 0400)  SpO2: 100 % (06/27/22 0500) Vital Signs (24h Range):  Temp:  [97.4 °F (36.3 °C)-98.7 °F (37.1 °C)] 98.5 °F (36.9 °C)  Pulse:  [56-97] 60  Resp:  [12-25] 25  SpO2:  [88 %-100 %] 100 %  BP: (128-146)/(70-98) 134/80     Date 06/27/22 0700 - 06/28/22 0659   Shift 6101-3458 1640-5716 9054-9767 24 Hour Total   INTAKE   Shift Total(mL/kg)       OUTPUT   Urine(mL/kg/hr) 300   300   Drains 40   40   Shift Total(mL/kg) 340(2.8)   340(2.8)   Weight (kg) 120 120 120 120                        Closed/Suction Drain 06/23/22 1800 Right Scalp Bulb (Active)   Site Description Unable to view 06/26/22 2000   Dressing Type Transparent (Tegaderm) 06/27/22 0800   Dressing Status Clean;Intact;Dry 06/27/22 0800   Dressing  "Intervention Integrity maintained 06/27/22 0800   Drainage Sanguineous 06/27/22 0800   Status To bulb suction 06/27/22 0800   Output (mL) 40 mL 06/27/22 0800            Urethral Catheter 06/23/22 1343 (Active)   Site Assessment Clean;Intact 06/26/22 2000   Collection Container Urimeter 06/26/22 2000   Securement Method secured to top of thigh w/ adhesive device 06/26/22 2000   Catheter Care Performed yes 06/26/22 2000   Reason for Continuing Urinary Catheterization Critically ill in ICU and requiring hourly monitoring of intake/output 06/26/22 2000   CAUTI Prevention Bundle Securement Device in place with 1" slack;Drainage bag/urimeter off the floor;Sheeting clip in use;No dependent loops or kinks;Drainage bag/urimeter not overfilled (<2/3 full);Drainage bag/urimeter below bladder;Intact seal between catheter & drainage tubing 06/25/22 2000   Output (mL) 175 mL 06/26/22 1800       Neurosurgery Physical Exam    Significant Labs:  Recent Labs   Lab 06/26/22  1327 06/26/22  1359 06/27/22  0224     --  148*   K 3.4*  --  4.0   CO2 23  --  23   BUN 14.5  --  16.7   CREATININE 1.25*  --  1.16   CALCIUM 7.5*  --  8.6   MG  --  2.25 2.30     Recent Labs   Lab 06/26/22  0253 06/27/22  0224   WBC 13.1* 10.5   HGB 7.3* 9.1*   HCT 23.8* 28.5*    135     No results for input(s): LABPT, INR, APTT in the last 48 hours.  Microbiology Results (last 7 days)     Procedure Component Value Units Date/Time    Blood Culture [[id number]]  (Normal) Collected: 06/24/22 1022    Order Status: Completed Specimen: Blood from Arm, Left Updated: 06/27/22 1201     CULTURE, BLOOD ([facility]) No Growth At 72 Hours    Respiratory Culture [[id number]]  (Abnormal) Collected: 06/24/22 1034    Order Status: Completed Specimen: Respiratory from Endotracheal Aspirate Updated: 06/26/22 0645     Respiratory Culture Moderate Bacillus species     GRAM STAIN Quality 3+      Moderate Gram Positive Rods      Few Gram positive cocci    Narrative:      With no " other respiratory izabela     Blood Culture [[id number]] Collected: 06/24/22 1047    Order Status: Resulted Specimen: Blood from Arm, Right Updated: 06/24/22 1047          Significant Diagnostics:      Assessment/Plan:  Left 4th/5th MC fracture, CMC Dislocation, Hamate fracture    Plan ORIF Left Hand this week.         There are no hospital problems to display for this patient.      Tuan Tavarez Jr, MD

## 2022-06-27 NOTE — PT/OT/SLP PROGRESS
SLP attempting to complete MBS however pt now lethargic and unable to remain awake despite max verbal/tactile stimulation.  Rec: continue NPO, ok for meds crushed in pudding when awake.  SLP to complete MBS as appropriate.

## 2022-06-27 NOTE — PROGRESS NOTES
I saw MR Ortiz today.  He is doing well - his incision is intact, he is sitting up, talking.    No issues with rhinorrhea or postnasal drip.  Denies salty or metallic taste in mouth.    He has had some issues with PO intake, and is set for MBS for formal eval.    He does have a left maxillary sinus fracture.  His occlusion appears as if it is consistent with premorbidity.  I think to be thorough, plan for his next CT head to include maxillofacial cuts through the mandible to rule out additional facial trauma.
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
## 2022-06-28 PROBLEM — T14.90XA TRAUMA: Status: ACTIVE | Noted: 2022-06-28

## 2022-06-28 LAB
ALBUMIN SERPL-MCNC: 2.7 GM/DL (ref 3.5–5)
ALBUMIN/GLOB SERPL: 1 RATIO (ref 1.1–2)
ALP SERPL-CCNC: 94 UNIT/L (ref 40–150)
ALT SERPL-CCNC: 45 UNIT/L (ref 0–55)
AST SERPL-CCNC: 46 UNIT/L (ref 5–34)
BASOPHILS # BLD AUTO: 0.05 X10(3)/MCL (ref 0–0.2)
BASOPHILS NFR BLD AUTO: 0.5 %
BILIRUBIN DIRECT+TOT PNL SERPL-MCNC: 1.1 MG/DL
BUN SERPL-MCNC: 15.6 MG/DL (ref 8.9–20.6)
CALCIUM SERPL-MCNC: 8.5 MG/DL (ref 8.4–10.2)
CHLORIDE SERPL-SCNC: 115 MMOL/L (ref 98–107)
CO2 SERPL-SCNC: 28 MMOL/L (ref 22–29)
CREAT SERPL-MCNC: 1.01 MG/DL (ref 0.73–1.18)
EOSINOPHIL # BLD AUTO: 0.13 X10(3)/MCL (ref 0–0.9)
EOSINOPHIL NFR BLD AUTO: 1.2 %
ERYTHROCYTE [DISTWIDTH] IN BLOOD BY AUTOMATED COUNT: 14.8 % (ref 11.5–17)
GLOBULIN SER-MCNC: 2.8 GM/DL (ref 2.4–3.5)
GLUCOSE SERPL-MCNC: 106 MG/DL (ref 74–100)
HCT VFR BLD AUTO: 27.2 % (ref 42–52)
HGB BLD-MCNC: 9 GM/DL (ref 14–18)
IMM GRANULOCYTES # BLD AUTO: 0.15 X10(3)/MCL (ref 0–0.02)
IMM GRANULOCYTES NFR BLD AUTO: 1.4 % (ref 0–0.43)
LYMPHOCYTES # BLD AUTO: 0.8 X10(3)/MCL (ref 0.6–4.6)
LYMPHOCYTES NFR BLD AUTO: 7.2 %
MAGNESIUM SERPL-MCNC: 2.1 MG/DL (ref 1.6–2.6)
MCH RBC QN AUTO: 29.3 PG (ref 27–31)
MCHC RBC AUTO-ENTMCNC: 33.1 MG/DL (ref 33–36)
MCV RBC AUTO: 88.6 FL (ref 80–94)
MONOCYTES # BLD AUTO: 0.81 X10(3)/MCL (ref 0.1–1.3)
MONOCYTES NFR BLD AUTO: 7.3 %
NEUTROPHILS # BLD AUTO: 9.1 X10(3)/MCL (ref 2.1–9.2)
NEUTROPHILS NFR BLD AUTO: 82.4 %
NRBC BLD AUTO-RTO: 0 %
PHOSPHATE SERPL-MCNC: 3.1 MG/DL (ref 2.3–4.7)
PLATELET # BLD AUTO: 148 X10(3)/MCL (ref 130–400)
PMV BLD AUTO: 10.2 FL (ref 9.4–12.4)
POCT GLUCOSE: 104 MG/DL (ref 70–110)
POTASSIUM SERPL-SCNC: 3.8 MMOL/L (ref 3.5–5.1)
PROT SERPL-MCNC: 5.5 GM/DL (ref 6.4–8.3)
RBC # BLD AUTO: 3.07 X10(6)/MCL (ref 4.7–6.1)
SODIUM SERPL-SCNC: 149 MMOL/L (ref 136–145)
VANCOMYCIN TROUGH SERPL-MCNC: 14.5 UG/ML (ref 15–20)
WBC # SPEC AUTO: 11.1 X10(3)/MCL (ref 4.5–11.5)

## 2022-06-28 PROCEDURE — 25000003 PHARM REV CODE 250: Performed by: STUDENT IN AN ORGANIZED HEALTH CARE EDUCATION/TRAINING PROGRAM

## 2022-06-28 PROCEDURE — 94761 N-INVAS EAR/PLS OXIMETRY MLT: CPT

## 2022-06-28 PROCEDURE — 85025 COMPLETE CBC W/AUTO DIFF WBC: CPT | Performed by: STUDENT IN AN ORGANIZED HEALTH CARE EDUCATION/TRAINING PROGRAM

## 2022-06-28 PROCEDURE — 36415 COLL VENOUS BLD VENIPUNCTURE: CPT | Performed by: STUDENT IN AN ORGANIZED HEALTH CARE EDUCATION/TRAINING PROGRAM

## 2022-06-28 PROCEDURE — A9698 NON-RAD CONTRAST MATERIALNOC: HCPCS | Performed by: SURGERY

## 2022-06-28 PROCEDURE — 20800000 HC ICU TRAUMA

## 2022-06-28 PROCEDURE — 63600175 PHARM REV CODE 636 W HCPCS: Performed by: NURSE PRACTITIONER

## 2022-06-28 PROCEDURE — 80053 COMPREHEN METABOLIC PANEL: CPT | Performed by: STUDENT IN AN ORGANIZED HEALTH CARE EDUCATION/TRAINING PROGRAM

## 2022-06-28 PROCEDURE — 63600175 PHARM REV CODE 636 W HCPCS: Performed by: STUDENT IN AN ORGANIZED HEALTH CARE EDUCATION/TRAINING PROGRAM

## 2022-06-28 PROCEDURE — 25500020 PHARM REV CODE 255: Performed by: SURGERY

## 2022-06-28 PROCEDURE — 92611 MOTION FLUOROSCOPY/SWALLOW: CPT

## 2022-06-28 PROCEDURE — 83735 ASSAY OF MAGNESIUM: CPT | Performed by: STUDENT IN AN ORGANIZED HEALTH CARE EDUCATION/TRAINING PROGRAM

## 2022-06-28 PROCEDURE — 84100 ASSAY OF PHOSPHORUS: CPT | Performed by: STUDENT IN AN ORGANIZED HEALTH CARE EDUCATION/TRAINING PROGRAM

## 2022-06-28 PROCEDURE — C9113 INJ PANTOPRAZOLE SODIUM, VIA: HCPCS | Performed by: STUDENT IN AN ORGANIZED HEALTH CARE EDUCATION/TRAINING PROGRAM

## 2022-06-28 PROCEDURE — 25000003 PHARM REV CODE 250: Performed by: NURSE PRACTITIONER

## 2022-06-28 PROCEDURE — 80202 ASSAY OF VANCOMYCIN: CPT | Performed by: SURGERY

## 2022-06-28 PROCEDURE — 63600175 PHARM REV CODE 636 W HCPCS: Performed by: SURGERY

## 2022-06-28 RX ORDER — QUETIAPINE FUMARATE 25 MG/1
50 TABLET, FILM COATED ORAL NIGHTLY
Status: DISCONTINUED | OUTPATIENT
Start: 2022-06-28 | End: 2022-06-30

## 2022-06-28 RX ORDER — SODIUM CHLORIDE 9 MG/ML
INJECTION, SOLUTION INTRAVENOUS CONTINUOUS
Status: CANCELLED | OUTPATIENT
Start: 2022-06-28

## 2022-06-28 RX ADMIN — VANCOMYCIN HYDROCHLORIDE 1000 MG: 1 INJECTION, POWDER, LYOPHILIZED, FOR SOLUTION INTRAVENOUS at 06:06

## 2022-06-28 RX ADMIN — LEVETIRACETAM 500 MG: 100 INJECTION, SOLUTION INTRAVENOUS at 09:06

## 2022-06-28 RX ADMIN — HYDRALAZINE HYDROCHLORIDE 10 MG: 20 INJECTION INTRAMUSCULAR; INTRAVENOUS at 07:06

## 2022-06-28 RX ADMIN — VANCOMYCIN HYDROCHLORIDE 1000 MG: 1 INJECTION, POWDER, LYOPHILIZED, FOR SOLUTION INTRAVENOUS at 09:06

## 2022-06-28 RX ADMIN — BARIUM SULFATE 10 ML: 0.81 POWDER, FOR SUSPENSION ORAL at 01:06

## 2022-06-28 RX ADMIN — VANCOMYCIN HYDROCHLORIDE 1000 MG: 1 INJECTION, POWDER, LYOPHILIZED, FOR SOLUTION INTRAVENOUS at 12:06

## 2022-06-28 RX ADMIN — QUETIAPINE 50 MG: 25 TABLET ORAL at 09:06

## 2022-06-28 RX ADMIN — PANTOPRAZOLE SODIUM 40 MG: 40 INJECTION, POWDER, FOR SOLUTION INTRAVENOUS at 09:06

## 2022-06-28 RX ADMIN — SODIUM CHLORIDE: 9 INJECTION, SOLUTION INTRAVENOUS at 03:06

## 2022-06-28 RX ADMIN — PIPERACILLIN SODIUM AND TAZOBACTAM SODIUM 4.5 G: 4; .5 INJECTION, POWDER, LYOPHILIZED, FOR SOLUTION INTRAVENOUS at 03:06

## 2022-06-28 RX ADMIN — PIPERACILLIN SODIUM AND TAZOBACTAM SODIUM 4.5 G: 4; .5 INJECTION, POWDER, LYOPHILIZED, FOR SOLUTION INTRAVENOUS at 07:06

## 2022-06-28 RX ADMIN — PIPERACILLIN SODIUM AND TAZOBACTAM SODIUM 4.5 G: 4; .5 INJECTION, POWDER, LYOPHILIZED, FOR SOLUTION INTRAVENOUS at 01:06

## 2022-06-28 NOTE — PLAN OF CARE
Problem: SLP  Goal: SLP Goal  Description: LTG: Pt will tolerate least restrictive PO diet with no clinical signs/sx aspiration    STGs:  Pt will tolerate easy to chew solids with improved bolus formation/transport  Pt will complete laryngeal elevation strengthening exercises with minimal-moderate cues.  Pt will tolerate thermal stimulation to the anterior faucial pillars with 100% effortful swallows and delay less than 2 seconds.   Outcome: Ongoing, Progressing       POC initiated and goals created.  Maricruz
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
## 2022-06-28 NOTE — TERTIARY TRAUMA SURVEY NOTE
TERTIARY TRAUMA SURVEY (TTS)    List Injuries Identified to Date:   1.  SDH   2. Pneumocephalus   3. L 4th and 5th metacarpal fracture   4. L Hamate fracture   5. Bifrontal bone fractures   6. SAH   7. Small lesion in the right lobe of the liver   8. Bilateral pulmonary emboli    List Operative and Procedures:   1.  History reviewed. No pertinent surgical history.    Past Medical History:   1.    Active Ambulatory Problems     Diagnosis Date Noted    No Active Ambulatory Problems     Resolved Ambulatory Problems     Diagnosis Date Noted    No Resolved Ambulatory Problems     No Additional Past Medical History     2. History reviewed. No pertinent past medical history.    General: NAD  Neuro: GCS 14, PERRLA, EOMI, +periorbital ecchymosis, scalp wound with staples in place  CV: RR, extrem wwp  Pulm: no increased wob, equal chest rise b/l  Abd: s/nt/nd  : external adam in place with pink tinged urine  MSK: moving all extremities      Imaging Findings Review:  X-Ray Chest 1 View    Result Date: 6/24/2022  EXAMINATION:  XR CHEST 1 VIEW    CLINICAL HISTORY:  respiratory failure;    TECHNIQUE:  Single frontal view of the chest was performed.    COMPARISON:  06/23/2022 at 11:06 hours    FINDINGS:  LINES AND TUBES: Endotracheal tube projects over the trachea with tip above the arturo.  Enteric tube courses below the diaphragm.  EKG/telemetry leads overlie the chest.    MEDIASTINUM AND MESSI: The cardiac silhouette is normal.    LUNGS: Improved right upper lobe aeration.  Mild persistent patchy atelectasis.    PLEURA:No pleural effusion. No pneumothorax.    BONES: No acute osseous abnormality.        X-Ray Chest 1 View    Result Date: 6/23/2022  EXAMINATION:  XR CHEST 1 VIEW    CLINICAL HISTORY:  r/o bleeding or hemorrhage;    TECHNIQUE:  Single frontal view of the chest was performed.    COMPARISON:  None    FINDINGS:  LINES AND TUBES: Endotracheal tube projects over the trachea with tip above the arturo.  Enteric tube  courses below the diaphragm.    MEDIASTINUM AND MESSI: Cardiac silhouette is at the upper limits of normal, likely exaggerated by portable technique.    LUNGS: Lung volumes are low with associated atelectatic change.  Ill-defined, asymmetric hazy opacities in the left lung.    PLEURA:No pleural effusion. No pneumothorax.    BONES: No acute osseous abnormality.        X-Ray Forearm Left    Result Date: 6/23/2022  EXAMINATION:  XR FOREARM LEFT    CLINICAL HISTORY:  Injury, unspecified, initial encounter    TECHNIQUE:  AP and lateral views of the left forearm were performed.    COMPARISON:  None    FINDINGS:  No appreciable forearm fracture.  See hand radiographs regarding injury at the 4th metacarpal and carpal metacarpal articulation.    Question punctate radiopaque debris at the skin versus debris on the detector.        X-Ray Hand 2 View Left    Result Date: 6/23/2022  EXAMINATION:  XR HAND 2 VIEW LEFT    CLINICAL HISTORY:  Injury, unspecified, initial encounter    TECHNIQUE:  DP, lateral views of the left hand were obtained.    COMPARISON:  None    FINDINGS:  There is a nondisplaced fracture at the base of the 4th metacarpal.    There is a fracture at the dorsum of the distal carpal row presumably fracture of the hamate seen on the lateral view. There is accompanying angulation at the 4th and 5th metacarpals on the lateral view presumably related to hamate injury.    There is soft tissue swelling.        X-Ray Knee 1 or 2 View Right    Result Date: 6/24/2022  EXAMINATION:  XR KNEE 1 OR 2 VIEW RIGHT    CLINICAL HISTORY:  knee effusion after trauma ;    TECHNIQUE:  AP and lateral views of the right knee were performed.    COMPARISON:  06/24/2022    FINDINGS:  There are no fractures seen.  There is no dislocation.  There are no bony lesions noted.        X-Ray Knee 1 or 2 View Right    Result Date: 6/24/2022  EXAMINATION:  XR KNEE 1 OR 2 VIEW RIGHT    CLINICAL HISTORY:  knee effusion after trauma;  Injury,  unspecified, initial encounter    TECHNIQUE:  AP and lateral views of the right knee were performed.    COMPARISON:  None    FINDINGS:  There are no fractures seen.  There is no dislocation.  There are no bony lesions noted.        CT Head Without Contrast    Result Date: 6/27/2022  EXAMINATION:  CT HEAD WITHOUT CONTRAST    CLINICAL HISTORY:  Craniotomy, post-op;    TECHNIQUE:  Low dose axial images were obtained through the head.  Coronal and sagittal reformations were also performed. Contrast was not administered.  DLP 1098.  Automated exposure control used.    COMPARISON:  06/24/2022    FINDINGS:  Postoperative change consistent with internal fixation of the bilateral frontal bone.  Hardware demonstrates satisfactory position with no interval change.  Bilateral maxillary sinus fluid.  Patchy filling of the ethmoid sinuses, near complete filling of the frontal sinuses, near complete filling of the sphenoid sinuses with fluid.  Bilateral anterior frontal lobe hemorrhagic contusions again seen.  The region of peripheral edema involving the left larger hemorrhagic contusion is slightly more prominent than seen in the interval.  There is localized mass effect without significant midline shift.  Bilateral frontal convexity mixed density subdural hemorrhage again seen, stable.  There is decreased pneumocephalus.  No new intra or extra-axial hemorrhage.  Ventricles, sulci, cisterns otherwise normal with no hydrocephalus.        CT Head Without Contrast    Result Date: 6/24/2022  EXAMINATION:  CT HEAD WITHOUT CONTRAST    CLINICAL HISTORY:  Pneumocephalus;    TECHNIQUE:  Axial scans were obtained from skull base to the vertex.    Coronal and sagittal reconstructions obtained from the axial data.    Contrast: None    Automatic exposure control was utilized to limit radiation dose.    Radiation Dose:    Total DLP: 1048 mGy*cm    COMPARISON:  Head CT 06/23/2022        CT Head Without Contrast    Result Date:  6/23/2022  EXAMINATION:  CT HEAD WITHOUT CONTRAST    CLINICAL HISTORY:  Trauma;    TECHNIQUE:  Axial scans were obtained from skull base to the vertex.    Coronal and sagittal reconstructions obtained from the axial data.    Automatic exposure control was utilized to limit radiation dose.    Contrast: None    Radiation Dose:    Total DLP: 971 mGy*cm    COMPARISON:  None    FINDINGS:  Scalp/Skull:    Acute comminuted pressed fracture of the bilateral frontal bones with extension through the bilateral frontal sinus anterior/posterior walls, bilateral orbital roofs and fovea ethmoidalis.    Bifrontal and right parietooccipital scalp hematomas.    Brain sulci: Appropriate for patient's age.    Ventricles: Normal in size and configuration. No hydrocephalus.    Extra-axial spaces:    Small volume bifrontal subarachnoid hemorrhage.    Thin subdural hematoma along the falx anteriorly and possibly along the anterior frontal convexities.    Otherwise no masses or fluid collections.    Small volume scattered pneumocephalus.    Parenchyma:    Bifrontal (L>R) hemorrhagic contusions as exemplified by a left frontal 1.8 x 1.5 cm hematoma (series 3, image 23)..    No CT evidence of an acute vascular insult.    Dural sinuses: No abnormal densities.    Sellar/Suprasellar region: No abnormalities.    Skull base and Craniocervical junction: No abnormalities.    Incidental findings:    Carotid siphon atherosclerotic vascular calcifications.    Scattered paranasal sinus hemorrhagic opacification.        CTA Chest Non-Coronary (PE Study)    Result Date: 6/23/2022  EXAMINATION:  CTA CHEST NON CORONARY    CLINICAL HISTORY:  trauma, dissection;    TECHNIQUE:  Low dose axial images, sagittal and coronal reformations were obtained from the thoracic inlet to the lung bases following the IV administration of 100 mL of Isovue 370..  Contrast timing was optimized to evaluate the pulmonary arteries.  MIP images were  performed.    COMPARISON:  None    FINDINGS:  Endotracheal tubes in place.  NG tubes in place.  Mediastinum reveals no significant adenopathy.  The thoracic aorta appears intact.    The visualized portion of the upper abdomen appears normal.    There is increased density in both lung bases with a differential of atelectasis versus infiltrate.  There is opacity in the posterior segment of the right upper lobe with a differential of atelectasis versus infiltrate.    The opacification of the pulmonary arteries is limited however there does appear to be a filling defect in the right lower lobe pulmonary artery most consistent with an embolus.  This is best seen on series 6, image 83.  Cannot rule out a small lesion in the right upper lobe and in the left lower lobe best seen on series 2, image 44..    No fractures are demonstrated        CT Cervical Spine Without Contrast    Result Date: 6/23/2022  EXAMINATION:  CT CERVICAL SPINE WITHOUT CONTRAST    CLINICAL HISTORY:  Trauma;    TECHNIQUE:  Low dose axial images, sagittal and coronal reformations were performed though the cervical spine.  Contrast was not administered.    Automatic exposure control (AEC) was utilized for dose reduction.    Dose: 458 mGycm    COMPARISON:  None    FINDINGS:  There are no fractures seen.  The alignment is within normal limits.  The odontoid is intact.  The intervertebral disc spaces are maintained.        CT Abdomen Pelvis With Contrast    Result Date: 6/23/2022  EXAMINATION:  CT ABDOMEN PELVIS WITH CONTRAST    CLINICAL HISTORY:  Abdominal trauma, blunt;    TECHNIQUE:  Low dose axial images, sagittal and coronal reformations were obtained from the lung bases to the pubic symphysis following the IV administration of 100 mL of Isovue 370 no oral contrast was given.    Automatic exposure control (AEC) was utilized for dose reduction.    Dose: 1517 mGycm    COMPARISON:  None.    FINDINGS:  There is a small focal lesion in the right lobe of the  liver measuring 9 mm this is seen on series 7, image 24.  The etiology of this is uncertain however this is not traumatic.  Spleen appears normal.  Pancreas appears normal.  The adrenals are not enlarged.  Kidneys appear normal.  Aorta shows calcification without an aneurysm present.  The urinary bladder appears intact the appendix appears normal.  No fractures are seen.        X-Ray Pelvis Routine AP    Result Date: 6/23/2022  EXAMINATION:  XR PELVIS ROUTINE AP    CLINICAL HISTORY:  r/o bleeding or hemorrhage;    TECHNIQUE:  AP view of the pelvis was performed.    COMPARISON:  None.    FINDINGS:  No appreciable fracture. No dislocation.    Regional soft tissues are unremarkable.        Echo    Result Date: 6/24/2022  · The estimated ejection fraction is 55%.  · Normal systolic function.  · Normal left ventricular diastolic function.  · Mild mitral regurgitation.  · Normal central venous pressure (3 mmHg).  · The estimated PA systolic pressure is 13 mmHg.         CV Ultrasound doppler venous legs bilat    Result Date: 6/23/2022  There was no evidence of deep or superficial vein thrombosis in the   bilateral lower extremities.        CT Hand Without Contrast Left    Result Date: 6/24/2022  EXAMINATION:  CT HAND WITHOUT CONTRAST LEFT    CLINICAL HISTORY:  Left hand fracture.    TECHNIQUE:  Axial CT slices through the left hand and wrist were obtained without the administration of contrast.  Coronal and sagittal reconstructions were created.  Automated exposure control was utilized.  Total DLP for the study is approximately 81 mGy cm.    COMPARISON:  Left hand x-rays dated 06/23/2022    FINDINGS:  There appears to be evidence of a comminuted, displaced, and apex dorsally angulated fracture with intra-articular extension at the proximal metadiaphysis and epiphysis of the 4th metacarpal.  There appears to be evidence of posterior dislocation of the 4th metacarpal at the level of the metacarpal-carpal joints.  There  appears to be a nondisplaced oblique fracture involving the lateral aspect of the mid to distal 5th metacarpal diaphysis extending to the distal metaphysis (for example series 21, image 29).  There appears to be evidence of posterior and medial dislocation of the 5th metacarpal at the level of the 5th metacarpal-carpal joint (for example series 21, image 32).  There are multiple small bone chip/fracture fragments about the 4th and 5th metacarpal-carpal joints which could be from the base of the 4th metacarpal.  However, there is also possible cortical irregularity involving the posterior and distal aspect of the hamate (for example series 17, image 40 and series 13,  image 39) which could reflect subtle fracture and possible donor site of some of the bone fragments.  There is a minimally displaced oblique fracture in the proximal shaft and meta epiphysis of the 4th digit proximal phalanx (for example series 21, image 30).  There is intra-articular extension of the fracture with slight articular cortical depression of 1-1.5 mm at the proximal epiphysis of the 4th digit proximal phalanx.  No evidence of scapholunate or lunotriquetral joint space widening is seen.  Slight ulnar minus variance is incidentally noted.  There appears to be diffuse soft tissue swelling about the hand and digits extending to the wrist and included distal forearm.  No unexpected radiopaque foreign bodies are seen.  Presumed pulse oximetry device artifact about the distal 3rd digit is present.        IR IVC Filter Insertion    Result Date: 6/24/2022  EXAMINATION:  IR IVC FILTER INSERTION; IR ULTRASOUND GUIDANCE    Ultrasound guided vascular access    IVC venogram (cavogram)    IVC filter placement    Followup IVC venogram (cavogram)    CLINICAL HISTORY:  Bilateral PEs;; Polytrauma, blunt;no/na;    Attendingg: Frank Childress M.D.    Anesthesia: Local sedation was utilized.    TECHNIQUE:  After the risks, benefits and alternatives were  "discussed, consent was obtained. A time out was performed to verify the patient's identity and procedure.    The patient was placed supine on the angiographic table. The right neck was examined using real-time ultrasound and a gray scale image was obtained. This area was cleaned and prepped in normal sterile fashion. Subcutaneous tissues were anesthetized with a lidocaine solution. A dermatotomy was performed with an #11 blade scalpel. Under direct ultrasound guidance, a micropuncture needle was inserted into the right internal jugular vein and a 0.018" wire was advanced through the needle and into the vena cava. The needle was removed and replaced with 3/4 coaxial catheter. Under fluoroscopic guidance, a 0.035" Gannon wire was then inserted into the coaxial catheter and into the vena cava. The catheter was exchanged for a flush catheter using the Seldinger technique. Cavogram was performed which demonstrated that the inferior vena cava is of normal-caliber without accessory veins or duplicated inferior vena cava. . Over a Bentson wire, the flush catheter was removed and replaced with the  vena cava filter sheath. Subsequently the filter was deployed and the post placement angiogram demonstrated with its tip at the level immediately below the renal veins.    Fluoroscopic guidance was used for all catheter and wire manipulations. Hemostasis was obtained by manual compression. The patient tolerated the procedure well. There were no immediate complications.    Estimated blood loss: 5 cc    Fluoro Time (min): 1.7    Fluoro Dose (mGy): 60        IR Ultrasound Guidance    Result Date: 6/24/2022  EXAMINATION:  IR IVC FILTER INSERTION; IR ULTRASOUND GUIDANCE    Ultrasound guided vascular access    IVC venogram (cavogram)    IVC filter placement    Followup IVC venogram (cavogram)    CLINICAL HISTORY:  Bilateral PEs;; Polytrauma, blunt;no/na;    Attendingg: Frank Childress M.D.    Anesthesia: Local sedation was " "utilized.    TECHNIQUE:  After the risks, benefits and alternatives were discussed, consent was obtained. A time out was performed to verify the patient's identity and procedure.    The patient was placed supine on the angiographic table. The right neck was examined using real-time ultrasound and a gray scale image was obtained. This area was cleaned and prepped in normal sterile fashion. Subcutaneous tissues were anesthetized with a lidocaine solution. A dermatotomy was performed with an #11 blade scalpel. Under direct ultrasound guidance, a micropuncture needle was inserted into the right internal jugular vein and a 0.018" wire was advanced through the needle and into the vena cava. The needle was removed and replaced with 3/4 coaxial catheter. Under fluoroscopic guidance, a 0.035" Gannon wire was then inserted into the coaxial catheter and into the vena cava. The catheter was exchanged for a flush catheter using the Seldinger technique. Cavogram was performed which demonstrated that the inferior vena cava is of normal-caliber without accessory veins or duplicated inferior vena cava. . Over a Bentson wire, the flush catheter was removed and replaced with the  vena cava filter sheath. Subsequently the filter was deployed and the post placement angiogram demonstrated with its tip at the level immediately below the renal veins.    Fluoroscopic guidance was used for all catheter and wire manipulations. Hemostasis was obtained by manual compression. The patient tolerated the procedure well. There were no immediate complications.    Estimated blood loss: 5 cc    Fluoro Time (min): 1.7    Fluoro Dose (mGy): 60        Results for orders placed or performed during the hospital encounter of 06/23/22   Blood Culture    Specimen: Arm, Left; Blood   Result Value Ref Range    CULTURE, BLOOD ([facility]) No Growth At 72 Hours    Respiratory Culture    Specimen: Endotracheal Aspirate; Respiratory   Result Value Ref Range    Respiratory " Culture Moderate Bacillus species (A)     GRAM STAIN Quality 3+ (A)     GRAM STAIN Moderate Gram Positive Rods (A)     GRAM STAIN Few Gram positive cocci (A)    Comprehensive metabolic panel   Result Value Ref Range    Sodium Level 140 136 - 145 mmol/L    Potassium Level 3.7 3.5 - 5.1 mmol/L    Chloride 105 98 - 107 mmol/L    Carbon Dioxide 24 22 - 29 mmol/L    Glucose Level 168 (H) 74 - 100 mg/dL    Blood Urea Nitrogen 8.7 (L) 8.9 - 20.6 mg/dL    Creatinine 0.81 0.73 - 1.18 mg/dL    Calcium Level Total 8.8 8.4 - 10.2 mg/dL    Protein Total 6.7 6.4 - 8.3 gm/dL    Albumin Level 3.7 3.5 - 5.0 gm/dL    Globulin 3.0 2.4 - 3.5 gm/dL    Albumin/Globulin Ratio 1.2 1.1 - 2.0 ratio    Bilirubin Total 0.8 <=1.5 mg/dL    Alkaline Phosphatase 137 40 - 150 unit/L    Alanine Aminotransferase 71 (H) 0 - 55 unit/L    Aspartate Aminotransferase 66 (H) 5 - 34 unit/L    Estimated GFR-Non  >60 mls/min/1.73/m2   Protime-INR   Result Value Ref Range    PT 13.2 12.5 - 14.5 seconds    INR 1.01 0.00 - 1.30   APTT   Result Value Ref Range    PTT 26.1 23.2 - 33.7 seconds   Lactic Acid, Plasma   Result Value Ref Range    Lactic Acid Level 3.6 (HH) 0.5 - 2.2 mmol/L   Urinalysis, Reflex to Urine Culture    Specimen: Urine   Result Value Ref Range    Color, UA Yellow Yellow, Colorless, Other, Clear    Appearance, UA Clear Clear    Specific Gravity, UA >=1.040 (H) 1.001 - 1.030    pH, UA 6.5 5.0, 5.5, 6.0, 6.5, 7.0, 7.5, 8.0, 8.5    Protein, UA 1+ (A) Negative, 300  mg/dL    Glucose, UA Negative Negative, Normal mg/dL    Ketones, UA Negative Negative, +1, +2, +3, +4, +5, >=160, >=80 mg/dL    Blood, UA Negative Negative unit/L    Bilirubin, UA Negative Negative mg/dL    Urobilinogen, UA 1.0 0.2, 1.0, Normal mg/dL    Nitrites, UA Negative Negative    Leukocyte Esterase, UA Negative Negative, 75  unit/L   Drug Screen, Urine   Result Value Ref Range    Amphetamines, Urine Negative Negative    Barbituates, Urine Negative Negative     Benzodiazepine, Urine Positive (A) Negative    Cannabinoids, Urine Negative Negative    Cocaine, Urine Negative Negative    Fentanyl, Urine Negative Negative    MDMA, Urine Negative Negative    Opiates, Urine Negative Negative    Phencyclidine, Urine Negative Negative    pH, Urine 6.5 3.0 - 11.0    Specific Gravity, Urine Auto 1.055 (H) 1.001 - 1.035   Ethanol   Result Value Ref Range    Ethanol Level <10.0 <=10.0 mg/dL   CBC with Differential   Result Value Ref Range    WBC 20.0 (H) 4.5 - 11.5 x10(3)/mcL    RBC 5.12 x10(6)/mcL    Hgb 14.5 gm/dL    Hct 44.9 %    MCV 87.7 80.0 - 94.0 fL    MCH 28.3 27.0 - 31.0 pg    MCHC 32.3 (L) 33.0 - 36.0 mg/dL    RDW 14.1 11.5 - 17.0 %    Platelet 216 130 - 400 x10(3)/mcL    MPV 9.9 9.4 - 12.4 fL    Neut % 78.8 %    Lymph % 11.9 %    Mono % 6.5 %    Eos % 1.2 %    Basophil % 0.6 %    Lymph # 2.38 0.6 - 4.6 x10(3)/mcL    Neut # 15.8 (H) 2.1 - 9.2 x10(3)/mcL    Mono # 1.31 (H) 0.1 - 1.3 x10(3)/mcL    Eos # 0.25 0 - 0.9 x10(3)/mcL    Baso # 0.13 0 - 0.2 x10(3)/mcL    IG# 0.21 (H) 0 - 0.0155 x10(3)/mcL    IG% 1.0 (H) 0 - 0.43 %    NRBC% 0.0 %   COVID-19 Rapid Screening   Result Value Ref Range    SARS COV-2 MOLECULAR Negative Negative   Lactic Acid, Plasma   Result Value Ref Range    Lactic Acid Level 4.7 (HH) 0.5 - 2.2 mmol/L   Urinalysis, Microscopic   Result Value Ref Range    RBC, UA 0-2 None Seen, 0-2, 3-5, 0-5 /HPF    WBC, UA 0-2 None Seen, 0-2, 3-5, 0-5 /HPF    Squamous Epithelial Cells, UA 0-4 0-4, None Seen /HPF    Bacteria, UA None Seen None Seen, Rare, Occasional /HPF   Lactic Acid, Plasma   Result Value Ref Range    Lactic Acid Level 4.1 (HH) 0.5 - 2.2 mmol/L   Lactic Acid, Plasma   Result Value Ref Range    Lactic Acid Level 3.7 (HH) 0.5 - 2.2 mmol/L   Phosphorus   Result Value Ref Range    Phosphorus Level 2.1 (L) 2.3 - 4.7 mg/dL   Magnesium   Result Value Ref Range    Magnesium Level 1.90 1.60 - 2.60 mg/dL   Comprehensive Metabolic Panel   Result Value Ref Range     Sodium Level 142 136 - 145 mmol/L    Potassium Level 4.3 3.5 - 5.1 mmol/L    Chloride 112 (H) 98 - 107 mmol/L    Carbon Dioxide 24 22 - 29 mmol/L    Glucose Level 176 (H) 74 - 100 mg/dL    Blood Urea Nitrogen 9.7 8.9 - 20.6 mg/dL    Creatinine 0.78 0.73 - 1.18 mg/dL    Calcium Level Total 7.6 (L) 8.4 - 10.2 mg/dL    Protein Total 5.3 (L) 6.4 - 8.3 gm/dL    Albumin Level 2.7 (L) 3.5 - 5.0 gm/dL    Globulin 2.6 2.4 - 3.5 gm/dL    Albumin/Globulin Ratio 1.0 (L) 1.1 - 2.0 ratio    Bilirubin Total 0.8 <=1.5 mg/dL    Alkaline Phosphatase 83 40 - 150 unit/L    Alanine Aminotransferase 43 0 - 55 unit/L    Aspartate Aminotransferase 33 5 - 34 unit/L    Estimated GFR-Non  >60 mls/min/1.73/m2   CBC with Differential   Result Value Ref Range    WBC 22.7 (H) 4.5 - 11.5 x10(3)/mcL    RBC 3.53 (L) 4.70 - 6.10 x10(6)/mcL    Hgb 10.2 (L) 14.0 - 18.0 gm/dL    Hct 31.0 (L) 42.0 - 52.0 %    MCV 87.8 80.0 - 94.0 fL    MCH 28.9 27.0 - 31.0 pg    MCHC 32.9 (L) 33.0 - 36.0 mg/dL    RDW 14.9 11.5 - 17.0 %    Platelet 177 130 - 400 x10(3)/mcL    MPV 10.1 9.4 - 12.4 fL    Neut % 87.9 %    Lymph % 4.5 %    Mono % 5.9 %    Eos % 0.3 %    Basophil % 0.3 %    Lymph # 1.03 0.6 - 4.6 x10(3)/mcL    Neut # 20.0 (H) 2.1 - 9.2 x10(3)/mcL    Mono # 1.33 (H) 0.1 - 1.3 x10(3)/mcL    Eos # 0.06 0 - 0.9 x10(3)/mcL    Baso # 0.07 0 - 0.2 x10(3)/mcL    IG# 0.25 (H) 0 - 0.0155 x10(3)/mcL    IG% 1.1 (H) 0 - 0.43 %    NRBC% 0.0 %   Lactic Acid, Plasma   Result Value Ref Range    Lactic Acid Level 2.3 (H) 0.5 - 2.2 mmol/L   Lactic Acid, Plasma   Result Value Ref Range    Lactic Acid Level 2.1 0.5 - 2.2 mmol/L   Lactic Acid, Plasma   Result Value Ref Range    Lactic Acid Level 1.8 0.5 - 2.2 mmol/L   VANCOMYCIN, TROUGH   Result Value Ref Range    Vancomycin Trough 15.2 15.0 - 20.0 ug/ml   Magnesium   Result Value Ref Range    Magnesium Level 2.10 1.60 - 2.60 mg/dL   Phosphorus   Result Value Ref Range    Phosphorus Level 1.8 (L) 2.3 - 4.7 mg/dL    Comprehensive Metabolic Panel   Result Value Ref Range    Sodium Level 144 136 - 145 mmol/L    Potassium Level 4.1 3.5 - 5.1 mmol/L    Chloride 114 (H) 98 - 107 mmol/L    Carbon Dioxide 26 22 - 29 mmol/L    Glucose Level 167 (H) 74 - 100 mg/dL    Blood Urea Nitrogen 8.8 (L) 8.9 - 20.6 mg/dL    Creatinine 0.83 0.73 - 1.18 mg/dL    Calcium Level Total 8.0 (L) 8.4 - 10.2 mg/dL    Protein Total 5.3 (L) 6.4 - 8.3 gm/dL    Albumin Level 2.8 (L) 3.5 - 5.0 gm/dL    Globulin 2.5 2.4 - 3.5 gm/dL    Albumin/Globulin Ratio 1.1 1.1 - 2.0 ratio    Bilirubin Total 0.8 <=1.5 mg/dL    Alkaline Phosphatase 75 40 - 150 unit/L    Alanine Aminotransferase 36 0 - 55 unit/L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

## 2022-06-28 NOTE — PROGRESS NOTES
Pharmacokinetic Assessment Follow Up: IV Vancomycin    Vancomycin serum concentration assessment(s):    The trough level was drawn correctly and can be used to guide therapy at this time. The measurement is within the desired definitive target range of 15 to 20 mcg/mL.  Trough was drawn 1.5 hours early on a q8h regimen and would be > 15 if drawn on time    Vancomycin Regimen Plan:    Continue regimen to Vancomycin 1000 mg IV every 8 hours with next serum trough concentration measured at 0300 prior to 7th dose on 6/30    Drug levels (last 3 results):  Recent Labs   Lab Result Units 06/26/22  1327 06/28/22  0435   Vancomycin Trough ug/ml 25.9* 14.5*       Pharmacy will continue to follow and monitor vancomycin.    Please contact pharmacy at extension [phone] for questions regarding this assessment.    Thank you for the consult,   Polo Gallagher       Patient brief summary:  Alexander Ortiz is a 46 y.o. male initiated on antimicrobial therapy with IV Vancomycin for treatment of  wounds with positive blood cultures    The patient's current regimen is 1000 mg IV vancomycin every 8 hours    Drug Allergies:   Review of patient's allergies indicates:  No Known Allergies    Actual Body Weight:   120 kg    Renal Function:   Estimated Creatinine Clearance: 119.3 mL/min (based on SCr of 1.01 mg/dL).,     Dialysis Method (if applicable):  N/A    CBC (last 72 hours):  Recent Labs   Lab Result Units 06/26/22  0253 06/27/22  0224 06/28/22  0435   WBC x10(3)/mcL 13.1* 10.5 11.1   Hgb gm/dL 7.3* 9.1* 9.0*   Hct % 23.8* 28.5* 27.2*   Platelet x10(3)/mcL 145 135 148   Mono % % 8.1 7.9 7.3   Eos % % 0.2 0.8 1.2   Basophil % % 0.4 0.6 0.5       Metabolic Panel (last 72 hours):  Recent Labs   Lab Result Units 06/26/22  1327 06/26/22  1359 06/27/22  0224 06/28/22  0435   Sodium Level mmol/L 142  --  148* 149*   Potassium Level mmol/L 3.4*  --  4.0 3.8   Chloride mmol/L 104  --  116* 115*   Carbon Dioxide mmol/L 23  --  23 28   Glucose Level  mg/dL 510*  --  126* 106*   Blood Urea Nitrogen mg/dL 14.5  --  16.7 15.6   Creatinine mg/dL 1.25*  --  1.16 1.01   Albumin Level gm/dL 2.6*  --  2.7* 2.7*   Bilirubin Total mg/dL 1.0  --  1.3 1.1   Alkaline Phosphatase unit/L 85  --  92 94   Aspartate Aminotransferase unit/L 45*  --  77* 46*   Alanine Aminotransferase unit/L 35  --  52 45   Magnesium Level mg/dL  --  2.25 2.30 2.10   Phosphorus Level mg/dL  --  2.6 3.4 3.1       Vancomycin Administrations:  vancomycin given in the last 96 hours                     vancomycin in dextrose 5 % 1 gram/250 mL IVPB 1,000 mg (mg) 1,000 mg New Bag 06/28/22 0606     1,000 mg New Bag 06/27/22 2008     1,000 mg New Bag  1224     1,000 mg New Bag  0413    vancomycin 1.5 g in dextrose 5 % 250 mL IVPB (ready to mix) (mg) 1,500 mg New Bag 06/26/22 0830     1,500 mg New Bag 06/25/22 1940     1,500 mg New Bag  1107     1,500 mg New Bag  0313     1,500 mg New Bag 06/24/22 1812     1,500 mg New Bag  1038                    Microbiologic Results:  Microbiology Results (last 7 days)       Procedure Component Value Units Date/Time    Blood Culture [[id number]]  (Normal) Collected: 06/24/22 1022    Order Status: Completed Specimen: Blood from Arm, Left Updated: 06/27/22 1201     CULTURE, BLOOD ([facility]) No Growth At 72 Hours    Respiratory Culture [[id number]]  (Abnormal) Collected: 06/24/22 1034    Order Status: Completed Specimen: Respiratory from Endotracheal Aspirate Updated: 06/26/22 0645     Respiratory Culture Moderate Bacillus species     GRAM STAIN Quality 3+      Moderate Gram Positive Rods      Few Gram positive cocci    Narrative:      With no other respiratory izabela     Blood Culture [[id number]] Collected: 06/24/22 1047    Order Status: Resulted Specimen: Blood from Arm, Right Updated: 06/24/22 1047
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
## 2022-06-28 NOTE — PT/OT/SLP PROGRESS
Pt resting upon SLP entry however easily arousable.  SLP to proceed with MBS to assess current swallow function.

## 2022-06-28 NOTE — PROCEDURES
"Speech Language Pathology Department  Modified Barium Swallow Study    Patient Name:  Alexander Ortiz   MRN:  [id number]  Diagnosis: Trauma     Recommendations:                 General Recommendations:  Dysphagia therapy and Speech/Language and Cognitive Evaluation   Repeat MBS study: 7-10 days or as clinically appropriate  Diet recommendations:  Soft & Bite Sized Diet - IDDSI Level 6, Liquid Diet Level: Mildly thick liquids - IDDSI Level 2   Swallow Strategies/Precautions: small bites/sips and medications per patient preference  General Precautions: Standard, aspiration    History:     A Modified Barium Swallow Study was completed to assess the efficiency of his/her swallow function, rule out aspiration and make recommendations regarding safe dietary consistencies, effective compensatory strategies, and safe eating environment.     History reviewed. No pertinent past medical history.    Home Diet: Regular and thin liquids  Current Method of Nutrition: NPO    Patient complaint: "I want ice."    Subjective:     Patient awake and alert.    Respiratory Status: room air      Fluoroscopic Results:     Oral Musculature Evaluation:   Oral Musculature: WFL   Dentition: present and adequate   Voice Prior to PO Intake: adequate    Visualization  · Patient was seen in the lateral view    Oral Phase:    Prolonged mastication   Bolus holding   Reduced bolus control    Pharyngeal Phase:     Consistency Laryngeal Penetration Aspiration Residue Strategy   Mildly thick liquids via cup none none Base of tongue, vallecular residue, mild Reduced with an additional swallow   Puree none none     Chewable solids none none Base of tongue, vallecular residue, mild Reduced with an additional swallow   Thin liquids via cup During the swallow, did NOT clear none Base of tongue, vallecular residue, mild Reduced with an additional swallow   Mildly thick liquids via straw none none Base of tongue, vallecular residue, mild Reduced with an " additional swallow      Swallow delay with spill to the valleculae   Reduced base of tongue retraction   Reduced airway protection   Laryngeal penetration with thin liquids   Base of tongue residue mild   Vallecular residue mild    Cervical Esophageal Phase:    UES appeared to accommodate all bolus types without stasis or retrograde movement visualized          Assessment:     The pt presents with mild-moderate oropharyngeal dysphagia characterized by prolonged mastication, reduced bolus control, reduced base of tongue retraction, and swallow delay with prespill to the pyriform sinuses resulting in reduced airway protection with laryngeal penetration of thin liquids during the swallow which did NOT clear.  Mild oropharyngeal residue was reduced with an additional swallow.  Although no aspiration was visualized, pt is at increased risk.  Skilled SLP services warranted to tx dysphagia.    Goals:   Multidisciplinary Problems     SLP Goals        Problem: SLP    Goal Priority Disciplines Outcome   SLP Goal     SLP Ongoing, Progressing   Description: LTG: Pt will tolerate least restrictive PO diet with no clinical signs/sx aspiration    STGs:  Pt will tolerate easy to chew solids with improved bolus formation/transport  Pt will complete laryngeal elevation strengthening exercises with minimal-moderate cues.  Pt will tolerate thermal stimulation to the anterior faucial pillars with 100% effortful swallows and delay less than 2 seconds.                    Plan:     Patient to be seen:  5 x/week   Plan of Care expires:  07/26/22  Plan of Care reviewed with:  patient   SLP Follow-Up:  Yes      Time Tracking:     SLP Treatment Date:   06/28/22  Speech Start Time:  1200  Speech Stop Time:  1230     Speech Total Time (min):  30 min    Billable minutes: Motion Fluoroscopic Evaluation, Video Recording, 30 minutes       06/28/2022

## 2022-06-29 LAB
ANION GAP SERPL CALC-SCNC: 5 MEQ/L
BACTERIA BLD CULT: NORMAL
BASOPHILS # BLD AUTO: 0.05 X10(3)/MCL (ref 0–0.2)
BASOPHILS NFR BLD AUTO: 0.4 %
BUN SERPL-MCNC: 11.6 MG/DL (ref 8.9–20.6)
CALCIUM SERPL-MCNC: 8.5 MG/DL (ref 8.4–10.2)
CHLORIDE SERPL-SCNC: 113 MMOL/L (ref 98–107)
CO2 SERPL-SCNC: 28 MMOL/L (ref 22–29)
CREAT SERPL-MCNC: 1.06 MG/DL (ref 0.73–1.18)
CREAT/UREA NIT SERPL: 11
EOSINOPHIL # BLD AUTO: 0.3 X10(3)/MCL (ref 0–0.9)
EOSINOPHIL NFR BLD AUTO: 2.2 %
ERYTHROCYTE [DISTWIDTH] IN BLOOD BY AUTOMATED COUNT: 14.8 % (ref 11.5–17)
GLUCOSE SERPL-MCNC: 124 MG/DL (ref 74–100)
HCT VFR BLD AUTO: 28.8 % (ref 42–52)
HGB BLD-MCNC: 9.3 GM/DL (ref 14–18)
IMM GRANULOCYTES # BLD AUTO: 0.35 X10(3)/MCL (ref 0–0.02)
IMM GRANULOCYTES NFR BLD AUTO: 2.5 % (ref 0–0.43)
LYMPHOCYTES # BLD AUTO: 0.84 X10(3)/MCL (ref 0.6–4.6)
LYMPHOCYTES NFR BLD AUTO: 6.1 %
MAGNESIUM SERPL-MCNC: 1.9 MG/DL (ref 1.6–2.6)
MCH RBC QN AUTO: 29.3 PG (ref 27–31)
MCHC RBC AUTO-ENTMCNC: 32.3 MG/DL (ref 33–36)
MCV RBC AUTO: 90.9 FL (ref 80–94)
MONOCYTES # BLD AUTO: 0.92 X10(3)/MCL (ref 0.1–1.3)
MONOCYTES NFR BLD AUTO: 6.6 %
NEUTROPHILS # BLD AUTO: 11.4 X10(3)/MCL (ref 2.1–9.2)
NEUTROPHILS NFR BLD AUTO: 82.2 %
NRBC BLD AUTO-RTO: 0 %
PHOSPHATE SERPL-MCNC: 2.7 MG/DL (ref 2.3–4.7)
PLATELET # BLD AUTO: 171 X10(3)/MCL (ref 130–400)
PMV BLD AUTO: 9.8 FL (ref 9.4–12.4)
POTASSIUM SERPL-SCNC: 3.2 MMOL/L (ref 3.5–5.1)
RBC # BLD AUTO: 3.17 X10(6)/MCL (ref 4.7–6.1)
SODIUM SERPL-SCNC: 146 MMOL/L (ref 136–145)
VANCOMYCIN TROUGH SERPL-MCNC: 16.4 UG/ML (ref 15–20)
WBC # SPEC AUTO: 13.9 X10(3)/MCL (ref 4.5–11.5)

## 2022-06-29 PROCEDURE — 97167 OT EVAL HIGH COMPLEX 60 MIN: CPT

## 2022-06-29 PROCEDURE — 94761 N-INVAS EAR/PLS OXIMETRY MLT: CPT

## 2022-06-29 PROCEDURE — 63600175 PHARM REV CODE 636 W HCPCS: Performed by: STUDENT IN AN ORGANIZED HEALTH CARE EDUCATION/TRAINING PROGRAM

## 2022-06-29 PROCEDURE — 97163 PT EVAL HIGH COMPLEX 45 MIN: CPT

## 2022-06-29 PROCEDURE — 63600175 PHARM REV CODE 636 W HCPCS: Performed by: NURSE PRACTITIONER

## 2022-06-29 PROCEDURE — 63600175 PHARM REV CODE 636 W HCPCS: Performed by: SURGERY

## 2022-06-29 PROCEDURE — 25000003 PHARM REV CODE 250: Performed by: STUDENT IN AN ORGANIZED HEALTH CARE EDUCATION/TRAINING PROGRAM

## 2022-06-29 PROCEDURE — 92523 SPEECH SOUND LANG COMPREHEN: CPT

## 2022-06-29 PROCEDURE — 25000003 PHARM REV CODE 250: Performed by: NURSE PRACTITIONER

## 2022-06-29 PROCEDURE — 85025 COMPLETE CBC W/AUTO DIFF WBC: CPT | Performed by: STUDENT IN AN ORGANIZED HEALTH CARE EDUCATION/TRAINING PROGRAM

## 2022-06-29 PROCEDURE — 36415 COLL VENOUS BLD VENIPUNCTURE: CPT | Performed by: STUDENT IN AN ORGANIZED HEALTH CARE EDUCATION/TRAINING PROGRAM

## 2022-06-29 PROCEDURE — 20800000 HC ICU TRAUMA

## 2022-06-29 PROCEDURE — C9113 INJ PANTOPRAZOLE SODIUM, VIA: HCPCS | Performed by: STUDENT IN AN ORGANIZED HEALTH CARE EDUCATION/TRAINING PROGRAM

## 2022-06-29 PROCEDURE — 80202 ASSAY OF VANCOMYCIN: CPT | Performed by: STUDENT IN AN ORGANIZED HEALTH CARE EDUCATION/TRAINING PROGRAM

## 2022-06-29 PROCEDURE — 84100 ASSAY OF PHOSPHORUS: CPT | Performed by: STUDENT IN AN ORGANIZED HEALTH CARE EDUCATION/TRAINING PROGRAM

## 2022-06-29 PROCEDURE — 80048 BASIC METABOLIC PNL TOTAL CA: CPT | Performed by: STUDENT IN AN ORGANIZED HEALTH CARE EDUCATION/TRAINING PROGRAM

## 2022-06-29 PROCEDURE — 83735 ASSAY OF MAGNESIUM: CPT | Performed by: STUDENT IN AN ORGANIZED HEALTH CARE EDUCATION/TRAINING PROGRAM

## 2022-06-29 RX ORDER — ENOXAPARIN SODIUM 100 MG/ML
30 INJECTION SUBCUTANEOUS EVERY 12 HOURS
Status: DISCONTINUED | OUTPATIENT
Start: 2022-06-29 | End: 2022-06-30

## 2022-06-29 RX ADMIN — LEVETIRACETAM 500 MG: 100 INJECTION, SOLUTION INTRAVENOUS at 09:06

## 2022-06-29 RX ADMIN — PANTOPRAZOLE SODIUM 40 MG: 40 INJECTION, POWDER, FOR SOLUTION INTRAVENOUS at 08:06

## 2022-06-29 RX ADMIN — PIPERACILLIN SODIUM AND TAZOBACTAM SODIUM 4.5 G: 4; .5 INJECTION, POWDER, LYOPHILIZED, FOR SOLUTION INTRAVENOUS at 12:06

## 2022-06-29 RX ADMIN — VANCOMYCIN HYDROCHLORIDE 1000 MG: 1 INJECTION, POWDER, LYOPHILIZED, FOR SOLUTION INTRAVENOUS at 05:06

## 2022-06-29 RX ADMIN — PIPERACILLIN SODIUM AND TAZOBACTAM SODIUM 4.5 G: 4; .5 INJECTION, POWDER, LYOPHILIZED, FOR SOLUTION INTRAVENOUS at 07:06

## 2022-06-29 RX ADMIN — PIPERACILLIN SODIUM AND TAZOBACTAM SODIUM 4.5 G: 4; .5 INJECTION, POWDER, LYOPHILIZED, FOR SOLUTION INTRAVENOUS at 03:06

## 2022-06-29 RX ADMIN — LEVETIRACETAM 500 MG: 100 INJECTION, SOLUTION INTRAVENOUS at 08:06

## 2022-06-29 RX ADMIN — QUETIAPINE 50 MG: 25 TABLET ORAL at 09:06

## 2022-06-29 RX ADMIN — HYDRALAZINE HYDROCHLORIDE 10 MG: 20 INJECTION INTRAMUSCULAR; INTRAVENOUS at 09:06

## 2022-06-29 RX ADMIN — SODIUM CHLORIDE: 9 INJECTION, SOLUTION INTRAVENOUS at 04:06

## 2022-06-29 RX ADMIN — ENOXAPARIN SODIUM 30 MG: 30 INJECTION SUBCUTANEOUS at 09:06
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
## 2022-06-29 NOTE — PROGRESS NOTES
[facility]  Neurosurgery  Progress Note    Subjective:     Interval History:      Doing well  Vital signs stable  Surgical incision clean dry and intact  Neurologically improved but still impulsive at times.  Tolerating food.    Plan:      Downgrade  PTOT  SCDs  Seizure precautions on Keppra.  Can transition to by mouth Keppra.  Mobilization  Fall precautions  One-to-one sitter for safety.            Post-Op Info:  Procedure(s) (LRB):  CRANIOTOMY (N/A)  DEBRIDEMENT-SINUS (Bilateral)   6 Days Post-Op      Medications:  Continuous Infusions:   sodium chloride 0.9% 75 mL/hr at 06/28/22 2200     Scheduled Meds:   levetiracetam IV  500 mg Intravenous Q12H    pantoprazole  40 mg Intravenous Daily    piperacillin-tazobactam (ZOSYN) IVPB  4.5 g Intravenous Q8H    QUEtiapine  50 mg Oral QHS    vancomycin (VANCOCIN) IVPB  1,000 mg Intravenous Q8H     PRN Meds:sodium chloride, dextrose 10%, dextrose 10%, enalaprilat, glucagon (human recombinant), hydrALAZINE, iopamidoL, sodium chloride 0.9%, Pharmacy to dose Vancomycin consult **AND** vancomycin - pharmacy to dose     Review of Systems    Objective:     Weight: 120 kg (264 lb 8.8 oz)  Body mass index is 39.05 kg/m².  Vital Signs (Most Recent):  Temp: 98.8 °F (37.1 °C) (06/29/22 0400)  Pulse: 62 (06/29/22 0615)  Resp: (!) 29 (06/29/22 0615)  BP: (!) 143/70 (06/29/22 0615)  SpO2: 96 % (06/29/22 0615) Vital Signs (24h Range):  Temp:  [97 °F (36.1 °C)-98.8 °F (37.1 °C)] 98.8 °F (37.1 °C)  Pulse:  [48-83] 62  Resp:  [13-33] 29  SpO2:  [89 %-100 %] 96 %  BP: ()/(37-91) 143/70                          Closed/Suction Drain 06/23/22 1800 Right Scalp Bulb (Active)   Site Description Unable to view 06/26/22 2000   Dressing Type Transparent (Tegaderm) 06/27/22 0800   Dressing Status Clean;Intact;Dry 06/27/22 0800   Dressing Intervention Integrity maintained 06/27/22 0800   Drainage Sanguineous 06/27/22 0800   Status To bulb suction 06/27/22  "0800   Output (mL) 40 mL 06/27/22 0800            Urethral Catheter 06/23/22 1343 (Active)   Site Assessment Clean;Intact 06/26/22 2000   Collection Container Urimeter 06/26/22 2000   Securement Method secured to top of thigh w/ adhesive device 06/26/22 2000   Catheter Care Performed yes 06/26/22 2000   Reason for Continuing Urinary Catheterization Critically ill in ICU and requiring hourly monitoring of intake/output 06/26/22 2000   CAUTI Prevention Bundle Securement Device in place with 1" slack;Drainage bag/urimeter off the floor;Sheeting clip in use;No dependent loops or kinks;Drainage bag/urimeter not overfilled (<2/3 full);Drainage bag/urimeter below bladder;Intact seal between catheter & drainage tubing 06/25/22 2000   Output (mL) 175 mL 06/26/22 1800       Neurosurgery Physical Exam      Significant Labs:  Recent Labs   Lab 06/28/22  0435 06/29/22  0518   * 146*   K 3.8 3.2*   CO2 28 28   BUN 15.6 11.6   CREATININE 1.01 1.06   CALCIUM 8.5 8.5   MG 2.10 1.90     Recent Labs   Lab 06/28/22  0435 06/29/22  0518   WBC 11.1 13.9*   HGB 9.0* 9.3*   HCT 27.2* 28.8*    171     No results for input(s): LABPT, INR, APTT in the last 48 hours.  Microbiology Results (last 7 days)     Procedure Component Value Units Date/Time    Blood Culture [[id number]]  (Normal) Collected: 06/24/22 1022    Order Status: Completed Specimen: Blood from Arm, Left Updated: 06/28/22 1202     CULTURE, BLOOD ([facility]) No Growth At 96 Hours    Blood Culture [[id number]]  (Normal) Collected: 06/24/22 1047    Order Status: Completed Specimen: Blood from Arm, Right Updated: 06/28/22 1102     CULTURE, BLOOD ([facility]) No Growth At 24 Hours    Respiratory Culture [[id number]]  (Abnormal) Collected: 06/24/22 1034    Order Status: Completed Specimen: Respiratory from Endotracheal Aspirate Updated: 06/26/22 0645     Respiratory Culture Moderate Bacillus species     GRAM STAIN Quality 3+      Moderate Gram Positive Rods      Few Gram positive cocci    " Narrative:      With no other respiratory izabela           Significant Diagnostics:               Active Diagnoses:    Diagnosis Date Noted POA    PRINCIPAL PROBLEM:  Trauma [T14.90XA] 06/28/2022 Unknown      Problems Resolved During this Admission:       OK Miller-BC  Neurosurgery  [facility] - [facility]

## 2022-06-29 NOTE — PT/OT/SLP EVAL
Speech Language Pathology Department  Cognitive-Communication Evaluation    Patient Name:  Alexander Ortiz   MRN:  [id number]  Admitting Diagnosis: Trauma      ID: [id number]    Recommendations:     Recommendations:                General Recommendations:  Dysphagia therapy and Cognitive-linguistic therapy  Communication strategies:      Discharge recommendations:  Discharge Facility/Level of Care Needs: rehabilitation facility   Barriers to Discharge:  severity of impairment    History:     History reviewed. No pertinent past medical history.    History reviewed. No pertinent surgical history.    Previous level of Function   Education:middle school   Occupation:         Subjective     Patient oriented oriented x3 (unsure of situation) and flat.    Patient goals: to get better     Pain/Comfort:  · Pain Rating 1: 0/10    Respiratory Status: room air    Objective:     SPEECH PRODUCTION   Phoneme Production: WFL   Voice Quality: WFL   Voice Production: WFL   Speech Rate: WFL   Loudness: WFL   Respiration: WFL   Resonance: WFL   Prosody: WFL   Speech Intelligibility  Known Context: Greater that 90%  Unknown Context: Greater that 90%    AUDITORY COMPREHENSION    Following Directions:   1-Step: 100   2-Step: 100  Yes/No Questions:   Biographical: 100   Environmental: 60   Simple: 40   Complex: 0    VERBAL EXPRESSION  Automatic Speech:   Days of the week: excluded Tuesday   Counting: WFL    Confrontation Naming   Objects: 100  Wh- Questions:   Object name: 60   Object function: 60    COGNITION  Orientation:   disoriented to situation  Attention:   Focused: WFL   Sustained: impaired  Pragmatics:   Eye contact: WFL   Facial expression: impaired  Memory:   Immediate: WFL   Short Term: impaired   Long Term: impaired  Problem Solving   Functional simple: impaired   Functional complex: impaired  Organization:   Convergent thinking: impaired   Divergent thinking:  impaired  Executive Function:   Attention to detail: impaired   Awareness: impaired   Information processing: impaired    Assessment:     The pt presents with speech abilities WFL however cognitive linguistic impairments are noted.  Skilled SLP services warranted to tx impairments.    Goals:   Multidisciplinary Problems     SLP Goals        Problem: SLP    Goal Priority Disciplines Outcome   SLP Goal     SLP Ongoing, Progressing   Description: LTG: Pt will tolerate least restrictive PO diet with no clinical signs/sx aspiration    STGs:  Pt will tolerate easy to chew solids with improved bolus formation/transport  Pt will complete laryngeal elevation strengthening exercises with minimal-moderate cues.  Pt will tolerate thermal stimulation to the anterior faucial pillars with 100% effortful swallows and delay less than 2 seconds.     LTG:  Pt will improve cognitive linguistic abilities to return to PLOF.    STGs:  Orientation x4, min assist  Environmental yes/no questions, 100%, min assist  Simple yes/no questions, 100%, min assist  Days of the week, 100%, min assist  Information processing                   Plan:     Patient to be seen:  5 x/week   Plan of Care expires:  07/26/22  Plan of Care reviewed with:  patient   SLP Follow-Up:  Yes       Time Tracking:     SLP Treatment Date:   06/29/22  Speech Start Time:  1430  Speech Stop Time:  1510    Speech Total Time (min):  40 minutes    Billable minutes:  Evaluation of Speech Sound Production with Comprehension and Expression, 40 minutes       06/29/2022

## 2022-06-29 NOTE — PLAN OF CARE
Problem: Occupational Therapy  Goal: Occupational Therapy Goal  Description: Goals to be met by: dc    Patient will increase functional independence with ADLs by performing:    UE dressing with mod I (excluding fasteners).  LE Dressing with Modified [place].  Grooming while standing at sink with Supervision.  Toileting/toilet t/f from toilet with Supervision for hygiene and clothing management.   Pt will increased L UE shld flexion strength to 4+/5.    Outcome: Ongoing, Progressing

## 2022-06-30 ENCOUNTER — ANESTHESIA EVENT (OUTPATIENT)
Dept: SURGERY | Facility: HOSPITAL | Age: 47
DRG: 957 | End: 2022-06-30
Payer: OTHER MISCELLANEOUS

## 2022-06-30 LAB
ANION GAP SERPL CALC-SCNC: 6 MEQ/L
BASOPHILS # BLD AUTO: 0.07 X10(3)/MCL (ref 0–0.2)
BASOPHILS NFR BLD AUTO: 0.6 %
BUN SERPL-MCNC: 9.7 MG/DL (ref 8.9–20.6)
CALCIUM SERPL-MCNC: 8.1 MG/DL (ref 8.4–10.2)
CHLORIDE SERPL-SCNC: 116 MMOL/L (ref 98–107)
CO2 SERPL-SCNC: 26 MMOL/L (ref 22–29)
CREAT SERPL-MCNC: 0.92 MG/DL (ref 0.73–1.18)
CREAT/UREA NIT SERPL: 11
EOSINOPHIL # BLD AUTO: 0.43 X10(3)/MCL (ref 0–0.9)
EOSINOPHIL NFR BLD AUTO: 3.4 %
ERYTHROCYTE [DISTWIDTH] IN BLOOD BY AUTOMATED COUNT: 14.8 % (ref 11.5–17)
GLUCOSE SERPL-MCNC: 105 MG/DL (ref 74–100)
HCT VFR BLD AUTO: 29.7 % (ref 42–52)
HGB BLD-MCNC: 9.4 GM/DL (ref 14–18)
IMM GRANULOCYTES # BLD AUTO: 0.36 X10(3)/MCL (ref 0–0.02)
IMM GRANULOCYTES NFR BLD AUTO: 2.8 % (ref 0–0.43)
LYMPHOCYTES # BLD AUTO: 0.95 X10(3)/MCL (ref 0.6–4.6)
LYMPHOCYTES NFR BLD AUTO: 7.5 %
MAGNESIUM SERPL-MCNC: 1.9 MG/DL (ref 1.6–2.6)
MCH RBC QN AUTO: 28.8 PG (ref 27–31)
MCHC RBC AUTO-ENTMCNC: 31.6 MG/DL (ref 33–36)
MCV RBC AUTO: 91.1 FL (ref 80–94)
MONOCYTES # BLD AUTO: 0.74 X10(3)/MCL (ref 0.1–1.3)
MONOCYTES NFR BLD AUTO: 5.8 %
NEUTROPHILS # BLD AUTO: 10.1 X10(3)/MCL (ref 2.1–9.2)
NEUTROPHILS NFR BLD AUTO: 79.9 %
NRBC BLD AUTO-RTO: 0 %
PHOSPHATE SERPL-MCNC: 3.2 MG/DL (ref 2.3–4.7)
PLATELET # BLD AUTO: 187 X10(3)/MCL (ref 130–400)
PMV BLD AUTO: 10 FL (ref 7.4–10.4)
POTASSIUM SERPL-SCNC: 3.8 MMOL/L (ref 3.5–5.1)
RBC # BLD AUTO: 3.26 X10(6)/MCL (ref 4.7–6.1)
SODIUM SERPL-SCNC: 148 MMOL/L (ref 136–145)
WBC # SPEC AUTO: 12.7 X10(3)/MCL (ref 4.5–11.5)

## 2022-06-30 PROCEDURE — 85025 COMPLETE CBC W/AUTO DIFF WBC: CPT | Performed by: STUDENT IN AN ORGANIZED HEALTH CARE EDUCATION/TRAINING PROGRAM

## 2022-06-30 PROCEDURE — 63600175 PHARM REV CODE 636 W HCPCS: Performed by: NURSE PRACTITIONER

## 2022-06-30 PROCEDURE — 80048 BASIC METABOLIC PNL TOTAL CA: CPT | Performed by: STUDENT IN AN ORGANIZED HEALTH CARE EDUCATION/TRAINING PROGRAM

## 2022-06-30 PROCEDURE — 97116 GAIT TRAINING THERAPY: CPT

## 2022-06-30 PROCEDURE — 25000003 PHARM REV CODE 250: Performed by: STUDENT IN AN ORGANIZED HEALTH CARE EDUCATION/TRAINING PROGRAM

## 2022-06-30 PROCEDURE — C9113 INJ PANTOPRAZOLE SODIUM, VIA: HCPCS | Performed by: STUDENT IN AN ORGANIZED HEALTH CARE EDUCATION/TRAINING PROGRAM

## 2022-06-30 PROCEDURE — 63600175 PHARM REV CODE 636 W HCPCS

## 2022-06-30 PROCEDURE — 97129 THER IVNTJ 1ST 15 MIN: CPT

## 2022-06-30 PROCEDURE — 63600175 PHARM REV CODE 636 W HCPCS: Performed by: SURGERY

## 2022-06-30 PROCEDURE — 63600175 PHARM REV CODE 636 W HCPCS: Performed by: STUDENT IN AN ORGANIZED HEALTH CARE EDUCATION/TRAINING PROGRAM

## 2022-06-30 PROCEDURE — 83735 ASSAY OF MAGNESIUM: CPT | Performed by: STUDENT IN AN ORGANIZED HEALTH CARE EDUCATION/TRAINING PROGRAM

## 2022-06-30 PROCEDURE — 84100 ASSAY OF PHOSPHORUS: CPT | Performed by: STUDENT IN AN ORGANIZED HEALTH CARE EDUCATION/TRAINING PROGRAM

## 2022-06-30 PROCEDURE — 25000003 PHARM REV CODE 250

## 2022-06-30 PROCEDURE — 25000003 PHARM REV CODE 250: Performed by: NURSE PRACTITIONER

## 2022-06-30 PROCEDURE — 97535 SELF CARE MNGMENT TRAINING: CPT | Mod: CO

## 2022-06-30 PROCEDURE — 20800000 HC ICU TRAUMA

## 2022-06-30 PROCEDURE — 36415 COLL VENOUS BLD VENIPUNCTURE: CPT | Performed by: STUDENT IN AN ORGANIZED HEALTH CARE EDUCATION/TRAINING PROGRAM

## 2022-06-30 RX ORDER — ENOXAPARIN SODIUM 100 MG/ML
40 INJECTION SUBCUTANEOUS EVERY 12 HOURS
Status: DISCONTINUED | OUTPATIENT
Start: 2022-06-30 | End: 2022-07-01

## 2022-06-30 RX ORDER — QUETIAPINE FUMARATE 25 MG/1
50 TABLET, FILM COATED ORAL NIGHTLY
Status: DISCONTINUED | OUTPATIENT
Start: 2022-06-30 | End: 2022-07-11 | Stop reason: HOSPADM

## 2022-06-30 RX ADMIN — HYDRALAZINE HYDROCHLORIDE 10 MG: 20 INJECTION INTRAMUSCULAR; INTRAVENOUS at 09:06

## 2022-06-30 RX ADMIN — PANTOPRAZOLE SODIUM 40 MG: 40 INJECTION, POWDER, FOR SOLUTION INTRAVENOUS at 08:06

## 2022-06-30 RX ADMIN — LEVETIRACETAM 500 MG: 100 INJECTION, SOLUTION INTRAVENOUS at 08:06

## 2022-06-30 RX ADMIN — PIPERACILLIN SODIUM AND TAZOBACTAM SODIUM 4.5 G: 4; .5 INJECTION, POWDER, LYOPHILIZED, FOR SOLUTION INTRAVENOUS at 01:06

## 2022-06-30 RX ADMIN — PIPERACILLIN SODIUM AND TAZOBACTAM SODIUM 4.5 G: 4; .5 INJECTION, POWDER, LYOPHILIZED, FOR SOLUTION INTRAVENOUS at 08:06

## 2022-06-30 RX ADMIN — SODIUM CHLORIDE: 9 INJECTION, SOLUTION INTRAVENOUS at 08:06

## 2022-06-30 RX ADMIN — QUETIAPINE FUMARATE 50 MG: 25 TABLET ORAL at 08:06

## 2022-06-30 RX ADMIN — ENOXAPARIN SODIUM 30 MG: 30 INJECTION SUBCUTANEOUS at 08:06

## 2022-06-30 RX ADMIN — ENOXAPARIN SODIUM 40 MG: 30 INJECTION SUBCUTANEOUS at 08:06
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
## 2022-06-30 NOTE — PT/OT/SLP PROGRESS
Occupational Therapy  Treatment    Alexander Ortiz   MRN: [id number]   Admitting Diagnosis: Trauma    OT Date of Treatment: 06/30/22   OT Start Time: 0952  OT Stop Time: 1020  OT Total Time (min): 28 min     Billable Minutes:  Self Care/Home Management 2  Total Minutes: 28     OT/ALEXIS: ALEXIS     ALEXIS Visit Number: 1    General Precautions: Standard, aspiration  Orthopedic Precautions:    Braces:           Subjective:  Communicated with RN prior to session.  Alert  Confused  Distratced  67 HR, 98o2, 158/77    Objective:  Pt. Ambulating from EOB to sink with Min A HHA for balance, cueing throughout session for safety during ambulation.  Pt. Standing at sink while performing grooming task (brushing teeth) with Minimal assist for preparation due to L UE.  Pt. Performing toileting task standing at toilet with CGA for balance.      Functional Mobility:  Bed Mobility:   Supine to sit: Moderate Assistance   Sit to supine: Moderate Assistance   Rolling: Moderate Assistance   Scooting: Moderate Assistance    Transfer Training:   Sit to stand:Minimal Assistance with No Assistive Device .    Balance:   Static Sit: SBA  Dynamic Sit:  SBA  Static Stand: CGA  Dynamic stand: Min A    Patient left up in chair with all lines intact and call button in reach    ASSESSMENT:  Alexander Ortiz is a 46 y.o. male with a medical diagnosis of Trauma and presents with increased cueing for safety, with increased processing time required throughout session.    Rehab potential is good    Activity tolerance: Good    Discharge recommendations: rehabilitation facility     Equipment recommendations:       GOALS:   Multidisciplinary Problems     Occupational Therapy Goals        Problem: Occupational Therapy    Goal Priority Disciplines Outcome Interventions   Occupational Therapy Goal     OT, PT/OT Ongoing, Progressing    Description: Goals to be met by: dc    Patient will increase functional independence with ADLs by performing:    UE dressing with mod I  (excluding fasteners).  LE Dressing with Modified [place].  Grooming while standing at sink with Supervision.  Toileting from toilet with Supervision for hygiene and clothing management.   Pt will increased L UE shld flexion strength to 4+/5.                     Plan:  Patient to be seen daily to address the above listed problems via self-care/home management, therapeutic activities, therapeutic exercises  Plan of Care expires: 07/29/22  Plan of Care reviewed with: patient         06/30/2022
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
## 2022-07-01 ENCOUNTER — ANESTHESIA (OUTPATIENT)
Dept: SURGERY | Facility: HOSPITAL | Age: 47
DRG: 957 | End: 2022-07-01
Payer: OTHER MISCELLANEOUS

## 2022-07-01 LAB
ANION GAP SERPL CALC-SCNC: 10 MEQ/L
BASOPHILS # BLD AUTO: 0.05 X10(3)/MCL (ref 0–0.2)
BASOPHILS NFR BLD AUTO: 0.4 %
BUN SERPL-MCNC: 7.6 MG/DL (ref 8.9–20.6)
CALCIUM SERPL-MCNC: 8.8 MG/DL (ref 8.4–10.2)
CHLORIDE SERPL-SCNC: 111 MMOL/L (ref 98–107)
CO2 SERPL-SCNC: 25 MMOL/L (ref 22–29)
CREAT SERPL-MCNC: 0.91 MG/DL (ref 0.73–1.18)
CREAT/UREA NIT SERPL: 8
EOSINOPHIL # BLD AUTO: 0.44 X10(3)/MCL (ref 0–0.9)
EOSINOPHIL NFR BLD AUTO: 3.6 %
ERYTHROCYTE [DISTWIDTH] IN BLOOD BY AUTOMATED COUNT: 14.9 % (ref 11.5–17)
GLUCOSE SERPL-MCNC: 94 MG/DL (ref 74–100)
HCT VFR BLD AUTO: 30.5 % (ref 42–52)
HGB BLD-MCNC: 10 GM/DL (ref 14–18)
IMM GRANULOCYTES # BLD AUTO: 0.22 X10(3)/MCL (ref 0–0.04)
IMM GRANULOCYTES NFR BLD AUTO: 1.8 %
LYMPHOCYTES # BLD AUTO: 1.46 X10(3)/MCL (ref 0.6–4.6)
LYMPHOCYTES NFR BLD AUTO: 11.9 %
MAGNESIUM SERPL-MCNC: 1.9 MG/DL (ref 1.6–2.6)
MCH RBC QN AUTO: 29 PG (ref 27–31)
MCHC RBC AUTO-ENTMCNC: 32.8 MG/DL (ref 33–36)
MCV RBC AUTO: 88.4 FL (ref 80–94)
MONOCYTES # BLD AUTO: 0.76 X10(3)/MCL (ref 0.1–1.3)
MONOCYTES NFR BLD AUTO: 6.2 %
NEUTROPHILS # BLD AUTO: 9.4 X10(3)/MCL (ref 2.1–9.2)
NEUTROPHILS NFR BLD AUTO: 76.1 %
NRBC BLD AUTO-RTO: 0 %
PHOSPHATE SERPL-MCNC: 3.8 MG/DL (ref 2.3–4.7)
PLATELET # BLD AUTO: 206 X10(3)/MCL (ref 130–400)
PMV BLD AUTO: 10 FL (ref 7.4–10.4)
POCT GLUCOSE: 268 MG/DL (ref 70–110)
POTASSIUM SERPL-SCNC: 3 MMOL/L (ref 3.5–5.1)
RBC # BLD AUTO: 3.45 X10(6)/MCL (ref 4.7–6.1)
SODIUM SERPL-SCNC: 146 MMOL/L (ref 136–145)
WBC # SPEC AUTO: 12.3 X10(3)/MCL (ref 4.5–11.5)

## 2022-07-01 PROCEDURE — 85025 COMPLETE CBC W/AUTO DIFF WBC: CPT | Performed by: STUDENT IN AN ORGANIZED HEALTH CARE EDUCATION/TRAINING PROGRAM

## 2022-07-01 PROCEDURE — 63600175 PHARM REV CODE 636 W HCPCS

## 2022-07-01 PROCEDURE — 71000033 HC RECOVERY, INTIAL HOUR: Performed by: REHABILITATION UNIT

## 2022-07-01 PROCEDURE — 27000221 HC OXYGEN, UP TO 24 HOURS

## 2022-07-01 PROCEDURE — 63600175 PHARM REV CODE 636 W HCPCS: Performed by: ANESTHESIOLOGY

## 2022-07-01 PROCEDURE — 25000003 PHARM REV CODE 250: Performed by: NURSE ANESTHETIST, CERTIFIED REGISTERED

## 2022-07-01 PROCEDURE — C9113 INJ PANTOPRAZOLE SODIUM, VIA: HCPCS | Performed by: STUDENT IN AN ORGANIZED HEALTH CARE EDUCATION/TRAINING PROGRAM

## 2022-07-01 PROCEDURE — 26608 TREAT METACARPAL FRACTURE: CPT | Mod: LT,,, | Performed by: REHABILITATION UNIT

## 2022-07-01 PROCEDURE — 25000003 PHARM REV CODE 250: Performed by: NEUROLOGICAL SURGERY

## 2022-07-01 PROCEDURE — 26720 PR CLOSE RX PROX/MID FING SHFT FX: ICD-10-PCS | Mod: 51,LT,, | Performed by: REHABILITATION UNIT

## 2022-07-01 PROCEDURE — 63600175 PHARM REV CODE 636 W HCPCS: Performed by: REHABILITATION UNIT

## 2022-07-01 PROCEDURE — 63600175 PHARM REV CODE 636 W HCPCS: Performed by: SURGERY

## 2022-07-01 PROCEDURE — 25000003 PHARM REV CODE 250: Performed by: STUDENT IN AN ORGANIZED HEALTH CARE EDUCATION/TRAINING PROGRAM

## 2022-07-01 PROCEDURE — 20800000 HC ICU TRAUMA

## 2022-07-01 PROCEDURE — 36000706: Performed by: REHABILITATION UNIT

## 2022-07-01 PROCEDURE — 63600175 PHARM REV CODE 636 W HCPCS: Performed by: STUDENT IN AN ORGANIZED HEALTH CARE EDUCATION/TRAINING PROGRAM

## 2022-07-01 PROCEDURE — 26608 PR CLOSED RX METACARPAL FX,PERCUT: ICD-10-PCS | Mod: LT,,, | Performed by: REHABILITATION UNIT

## 2022-07-01 PROCEDURE — 37000009 HC ANESTHESIA EA ADD 15 MINS: Performed by: REHABILITATION UNIT

## 2022-07-01 PROCEDURE — 25000003 PHARM REV CODE 250

## 2022-07-01 PROCEDURE — 84100 ASSAY OF PHOSPHORUS: CPT | Performed by: STUDENT IN AN ORGANIZED HEALTH CARE EDUCATION/TRAINING PROGRAM

## 2022-07-01 PROCEDURE — 36415 COLL VENOUS BLD VENIPUNCTURE: CPT | Performed by: STUDENT IN AN ORGANIZED HEALTH CARE EDUCATION/TRAINING PROGRAM

## 2022-07-01 PROCEDURE — 37000008 HC ANESTHESIA 1ST 15 MINUTES: Performed by: REHABILITATION UNIT

## 2022-07-01 PROCEDURE — 63600175 PHARM REV CODE 636 W HCPCS: Performed by: NURSE ANESTHETIST, CERTIFIED REGISTERED

## 2022-07-01 PROCEDURE — C1769 GUIDE WIRE: HCPCS | Performed by: REHABILITATION UNIT

## 2022-07-01 PROCEDURE — 36000707: Performed by: REHABILITATION UNIT

## 2022-07-01 PROCEDURE — 83735 ASSAY OF MAGNESIUM: CPT | Performed by: STUDENT IN AN ORGANIZED HEALTH CARE EDUCATION/TRAINING PROGRAM

## 2022-07-01 PROCEDURE — 80048 BASIC METABOLIC PNL TOTAL CA: CPT | Performed by: STUDENT IN AN ORGANIZED HEALTH CARE EDUCATION/TRAINING PROGRAM

## 2022-07-01 PROCEDURE — 26720 TREAT FINGER FRACTURE EACH: CPT | Mod: 51,LT,, | Performed by: REHABILITATION UNIT

## 2022-07-01 DEVICE — IMPLANTABLE DEVICE: Type: IMPLANTABLE DEVICE | Site: HAND | Status: FUNCTIONAL

## 2022-07-01 DEVICE — KWIRE TRCR SNGL .062X9IN 1.6MM: Type: IMPLANTABLE DEVICE | Site: HAND | Status: FUNCTIONAL

## 2022-07-01 RX ORDER — CEFAZOLIN SODIUM 2 G/100ML
2 INJECTION, SOLUTION INTRAVENOUS
Status: DISCONTINUED | OUTPATIENT
Start: 2022-07-01 | End: 2022-07-01

## 2022-07-01 RX ORDER — ROCURONIUM BROMIDE 10 MG/ML
INJECTION, SOLUTION INTRAVENOUS
Status: DISCONTINUED | OUTPATIENT
Start: 2022-07-01 | End: 2022-07-01

## 2022-07-01 RX ORDER — MIDAZOLAM HYDROCHLORIDE 1 MG/ML
INJECTION INTRAMUSCULAR; INTRAVENOUS
Status: DISCONTINUED | OUTPATIENT
Start: 2022-07-01 | End: 2022-07-01

## 2022-07-01 RX ORDER — FENTANYL CITRATE 50 UG/ML
INJECTION, SOLUTION INTRAMUSCULAR; INTRAVENOUS
Status: DISCONTINUED | OUTPATIENT
Start: 2022-07-01 | End: 2022-07-01

## 2022-07-01 RX ORDER — ACETAMINOPHEN 650 MG/1
650 SUPPOSITORY RECTAL EVERY 4 HOURS PRN
Status: DISCONTINUED | OUTPATIENT
Start: 2022-07-01 | End: 2022-07-11 | Stop reason: HOSPADM

## 2022-07-01 RX ORDER — KETOROLAC TROMETHAMINE 30 MG/ML
15 INJECTION, SOLUTION INTRAMUSCULAR; INTRAVENOUS EVERY 8 HOURS PRN
Status: DISPENSED | OUTPATIENT
Start: 2022-07-01 | End: 2022-07-03

## 2022-07-01 RX ORDER — IBUPROFEN 200 MG
16 TABLET ORAL
Status: DISCONTINUED | OUTPATIENT
Start: 2022-07-01 | End: 2022-07-01

## 2022-07-01 RX ORDER — PROCHLORPERAZINE EDISYLATE 5 MG/ML
5 INJECTION INTRAMUSCULAR; INTRAVENOUS EVERY 6 HOURS PRN
Status: DISCONTINUED | OUTPATIENT
Start: 2022-07-01 | End: 2022-07-11 | Stop reason: HOSPADM

## 2022-07-01 RX ORDER — CEFAZOLIN SODIUM 2 G/50ML
2 SOLUTION INTRAVENOUS ONCE
Status: COMPLETED | OUTPATIENT
Start: 2022-07-01 | End: 2022-07-01

## 2022-07-01 RX ORDER — LEVETIRACETAM 500 MG/5ML
500 INJECTION, SOLUTION, CONCENTRATE INTRAVENOUS EVERY 12 HOURS
Status: DISCONTINUED | OUTPATIENT
Start: 2022-07-02 | End: 2022-07-02

## 2022-07-01 RX ORDER — HYDROMORPHONE HYDROCHLORIDE 2 MG/ML
0.5 INJECTION, SOLUTION INTRAMUSCULAR; INTRAVENOUS; SUBCUTANEOUS EVERY 5 MIN PRN
Status: DISCONTINUED | OUTPATIENT
Start: 2022-07-01 | End: 2022-07-01

## 2022-07-01 RX ORDER — ONDANSETRON 2 MG/ML
4 INJECTION INTRAMUSCULAR; INTRAVENOUS EVERY 6 HOURS PRN
Status: DISCONTINUED | OUTPATIENT
Start: 2022-07-01 | End: 2022-07-11 | Stop reason: HOSPADM

## 2022-07-01 RX ORDER — IBUPROFEN 200 MG
24 TABLET ORAL
Status: DISCONTINUED | OUTPATIENT
Start: 2022-07-01 | End: 2022-07-01

## 2022-07-01 RX ORDER — DEXAMETHASONE SODIUM PHOSPHATE 4 MG/ML
INJECTION, SOLUTION INTRA-ARTICULAR; INTRALESIONAL; INTRAMUSCULAR; INTRAVENOUS; SOFT TISSUE
Status: DISCONTINUED | OUTPATIENT
Start: 2022-07-01 | End: 2022-07-01

## 2022-07-01 RX ORDER — CEFAZOLIN SODIUM 2 G/100ML
2 INJECTION, SOLUTION INTRAVENOUS
Status: DISCONTINUED | OUTPATIENT
Start: 2022-07-01 | End: 2022-07-02

## 2022-07-01 RX ORDER — HYDROCODONE BITARTRATE AND ACETAMINOPHEN 10; 325 MG/1; MG/1
1 TABLET ORAL EVERY 4 HOURS PRN
Status: DISCONTINUED | OUTPATIENT
Start: 2022-07-01 | End: 2022-07-10

## 2022-07-01 RX ORDER — GLUCAGON 1 MG
1 KIT INJECTION
Status: DISCONTINUED | OUTPATIENT
Start: 2022-07-01 | End: 2022-07-01

## 2022-07-01 RX ORDER — CEFAZOLIN SODIUM 1 G/3ML
INJECTION, POWDER, FOR SOLUTION INTRAMUSCULAR; INTRAVENOUS
Status: DISCONTINUED | OUTPATIENT
Start: 2022-07-01 | End: 2022-07-01

## 2022-07-01 RX ORDER — ONDANSETRON 2 MG/ML
INJECTION INTRAMUSCULAR; INTRAVENOUS
Status: DISCONTINUED | OUTPATIENT
Start: 2022-07-01 | End: 2022-07-01

## 2022-07-01 RX ORDER — CEFAZOLIN SODIUM 2 G/50ML
2 SOLUTION INTRAVENOUS
Status: DISCONTINUED | OUTPATIENT
Start: 2022-07-01 | End: 2022-07-01

## 2022-07-01 RX ORDER — SODIUM CHLORIDE 0.9 % (FLUSH) 0.9 %
10 SYRINGE (ML) INJECTION
Status: DISCONTINUED | OUTPATIENT
Start: 2022-07-01 | End: 2022-07-01

## 2022-07-01 RX ORDER — LIDOCAINE HYDROCHLORIDE 20 MG/ML
INJECTION, SOLUTION EPIDURAL; INFILTRATION; INTRACAUDAL; PERINEURAL
Status: DISCONTINUED | OUTPATIENT
Start: 2022-07-01 | End: 2022-07-01

## 2022-07-01 RX ORDER — HYDROCODONE BITARTRATE AND ACETAMINOPHEN 5; 325 MG/1; MG/1
1 TABLET ORAL EVERY 4 HOURS PRN
Status: DISCONTINUED | OUTPATIENT
Start: 2022-07-01 | End: 2022-07-10

## 2022-07-01 RX ORDER — ENOXAPARIN SODIUM 150 MG/ML
1 INJECTION SUBCUTANEOUS
Status: DISCONTINUED | OUTPATIENT
Start: 2022-07-01 | End: 2022-07-11 | Stop reason: HOSPADM

## 2022-07-01 RX ORDER — SODIUM CHLORIDE 9 MG/ML
INJECTION, SOLUTION INTRAVENOUS CONTINUOUS
Status: DISCONTINUED | OUTPATIENT
Start: 2022-07-01 | End: 2022-07-05

## 2022-07-01 RX ORDER — PROPOFOL 10 MG/ML
VIAL (ML) INTRAVENOUS
Status: DISCONTINUED | OUTPATIENT
Start: 2022-07-01 | End: 2022-07-01

## 2022-07-01 RX ORDER — PROMETHAZINE HYDROCHLORIDE 25 MG/ML
INJECTION, SOLUTION INTRAMUSCULAR; INTRAVENOUS
Status: DISPENSED
Start: 2022-07-01 | End: 2022-07-01

## 2022-07-01 RX ORDER — INSULIN ASPART 100 [IU]/ML
0-5 INJECTION, SOLUTION INTRAVENOUS; SUBCUTANEOUS
Status: DISCONTINUED | OUTPATIENT
Start: 2022-07-01 | End: 2022-07-01

## 2022-07-01 RX ADMIN — PROPOFOL 50 MG: 10 INJECTION, EMULSION INTRAVENOUS at 07:07

## 2022-07-01 RX ADMIN — ROCURONIUM BROMIDE 50 MG: 10 SOLUTION INTRAVENOUS at 07:07

## 2022-07-01 RX ADMIN — ONDANSETRON 4 MG: 2 INJECTION INTRAMUSCULAR; INTRAVENOUS at 07:07

## 2022-07-01 RX ADMIN — CEFAZOLIN SODIUM 2000 MG: 2 INJECTION, SOLUTION INTRAVENOUS at 09:07

## 2022-07-01 RX ADMIN — HYDRALAZINE HYDROCHLORIDE 10 MG: 20 INJECTION INTRAMUSCULAR; INTRAVENOUS at 05:07

## 2022-07-01 RX ADMIN — ENOXAPARIN SODIUM 120 MG: 150 INJECTION SUBCUTANEOUS at 08:07

## 2022-07-01 RX ADMIN — PANTOPRAZOLE SODIUM 40 MG: 40 INJECTION, POWDER, FOR SOLUTION INTRAVENOUS at 09:07

## 2022-07-01 RX ADMIN — SODIUM CHLORIDE, SODIUM GLUCONATE, SODIUM ACETATE, POTASSIUM CHLORIDE AND MAGNESIUM CHLORIDE: 526; 502; 368; 37; 30 INJECTION, SOLUTION INTRAVENOUS at 06:07

## 2022-07-01 RX ADMIN — ENOXAPARIN SODIUM 40 MG: 30 INJECTION SUBCUTANEOUS at 09:07

## 2022-07-01 RX ADMIN — HYDROMORPHONE HYDROCHLORIDE 0.5 MG: 2 INJECTION, SOLUTION INTRAMUSCULAR; INTRAVENOUS; SUBCUTANEOUS at 08:07

## 2022-07-01 RX ADMIN — SUGAMMADEX 200 MG: 100 INJECTION, SOLUTION INTRAVENOUS at 08:07

## 2022-07-01 RX ADMIN — HYDROMORPHONE HYDROCHLORIDE 0.5 MG: 2 INJECTION, SOLUTION INTRAMUSCULAR; INTRAVENOUS; SUBCUTANEOUS at 09:07

## 2022-07-01 RX ADMIN — CEFAZOLIN SODIUM 2000 MG: 2 INJECTION, SOLUTION INTRAVENOUS at 05:07

## 2022-07-01 RX ADMIN — MIDAZOLAM 2 MG: 1 INJECTION INTRAMUSCULAR; INTRAVENOUS at 06:07

## 2022-07-01 RX ADMIN — PROPOFOL 150 MG: 10 INJECTION, EMULSION INTRAVENOUS at 07:07

## 2022-07-01 RX ADMIN — LIDOCAINE HYDROCHLORIDE 80 MG: 20 INJECTION, SOLUTION EPIDURAL; INFILTRATION; INTRACAUDAL; PERINEURAL at 07:07

## 2022-07-01 RX ADMIN — CEFAZOLIN SODIUM 2 G: 2 SOLUTION INTRAVENOUS at 07:07

## 2022-07-01 RX ADMIN — POTASSIUM PHOSPHATE, MONOBASIC AND POTASSIUM PHOSPHATE, DIBASIC 20 MMOL: 224; 236 INJECTION, SOLUTION, CONCENTRATE INTRAVENOUS at 10:07

## 2022-07-01 RX ADMIN — DEXAMETHASONE SODIUM PHOSPHATE 4 MG: 4 INJECTION, SOLUTION INTRA-ARTICULAR; INTRALESIONAL; INTRAMUSCULAR; INTRAVENOUS; SOFT TISSUE at 07:07

## 2022-07-01 RX ADMIN — HYDROCODONE BITARTRATE AND ACETAMINOPHEN 1 TABLET: 5; 325 TABLET ORAL at 08:07

## 2022-07-01 RX ADMIN — QUETIAPINE FUMARATE 50 MG: 25 TABLET ORAL at 08:07

## 2022-07-01 RX ADMIN — FENTANYL CITRATE 50 MCG: 50 INJECTION, SOLUTION INTRAMUSCULAR; INTRAVENOUS at 07:07

## 2022-07-01 NOTE — PT/OT/SLP PROGRESS
Occupational Therapy      Patient Name:  Alexander Ortiz   MRN:  [id number]    Patient not seen today secondary to Off the floor for procedure/surgery. Will follow-up 7/1. ORIF L hand OR henri PARISH OT to follow up as appropriate.    7/1/2022

## 2022-07-01 NOTE — TRANSFER OF CARE
"Anesthesia Transfer of Care Note    Patient: Alexander Ortiz    Procedure(s) Performed: Procedure(s) (LRB):  ORIF, HAND (Left)    Patient location: PACU    Anesthesia Type: general    Transport from OR: Transported from OR on 6-10 L/min O2 by face mask with adequate spontaneous ventilation    Post pain: adequate analgesia    Post assessment: no apparent anesthetic complications    Post vital signs: stable    Level of consciousness: sedated and responds to stimulation    Nausea/Vomiting: no nausea/vomiting    Complications: none    Transfer of care protocol was followed      Last vitals:   Visit Vitals  /66   Pulse (!) 117   Temp 36.5 °C (97.7 °F) (Skin)   Resp (!) 24   Ht 5' 9.02" (1.753 m)   Wt 120 kg (264 lb 8.8 oz)   SpO2 98%   BMI 39.05 kg/m²     "

## 2022-07-01 NOTE — PT/OT/SLP PROGRESS
Physical Therapy      Patient Name:  Alexander Ortiz   MRN:  [id number]    Pt in surgery. Will f/u tomorrow.

## 2022-07-01 NOTE — ANESTHESIA POSTPROCEDURE EVALUATION
Anesthesia Post Evaluation    Patient: Alexander Ortiz    Procedure(s) Performed: Procedure(s) (LRB):  ORIF, HAND (Left)    Final Anesthesia Type: general      Patient location during evaluation: PACU  Patient participation: Yes- Able to Participate  Level of consciousness: awake and alert and oriented  Post-procedure vital signs: reviewed and stable  Pain management: adequate  Airway patency: patent    PONV status at discharge: No PONV  Anesthetic complications: no      Cardiovascular status: blood pressure returned to baseline and hemodynamically stable  Respiratory status: unassisted and spontaneous ventilation  Hydration status: euvolemic  Follow-up not needed.          Vitals Value Taken Time   /71 07/01/22 1331   Temp 36.5 °C (97.7 °F) 07/01/22 1200   Pulse 73 07/01/22 1336   Resp 21 07/01/22 1336   SpO2 98 % 07/01/22 1336   Vitals shown include unvalidated device data.      Event Time   Out of Recovery 07/01/2022 08:55:00         Pain/Jazmyne Score: Pain Rating Prior to Med Admin: 6 (7/1/2022  9:53 AM)  Pain Rating Post Med Admin: 0 (7/1/2022 10:06 AM)  Jazmyne Score: 8 (7/1/2022  8:50 AM)
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
## 2022-07-01 NOTE — ANESTHESIA PROCEDURE NOTES
Intubation    Date/Time: 7/1/2022 7:09 AM  Performed by: Mitchell Pinto CRNA  Authorized by: Manjeet Irving MD     Intubation:     Induction:  Intravenous    Intubated:  Postinduction    Mask Ventilation:  Easy with oral airway    Attempts:  1    Attempted By:  CRNA    Method of Intubation:  Direct    Blade:  Sadler 4    Laryngeal View Grade: Grade I - full view of cords      Difficult Airway Encountered?: No      Complications:  None    Airway Device:  Oral endotracheal tube    Airway Device Size:  7.5    Style/Cuff Inflation:  Cuffed (inflated to minimal occlusive pressure)    Inflation Amount (mL):  6    Tube secured:  24    Secured at:  The lips    Placement Verified By:  Capnometry    Complicating Factors:  Obesity    Findings Post-Intubation:  BS equal bilateral

## 2022-07-02 LAB
ANION GAP SERPL CALC-SCNC: 8 MEQ/L
APPEARANCE UR: CLEAR
BACTERIA #/AREA URNS AUTO: ABNORMAL /HPF
BACTERIA BLD CULT: NORMAL
BASOPHILS # BLD AUTO: 0.04 X10(3)/MCL (ref 0–0.2)
BASOPHILS NFR BLD AUTO: 0.3 %
BILIRUB UR QL STRIP.AUTO: NEGATIVE MG/DL
BUN SERPL-MCNC: 8.4 MG/DL (ref 8.9–20.6)
CALCIUM SERPL-MCNC: 8.1 MG/DL (ref 8.4–10.2)
CHLORIDE SERPL-SCNC: 111 MMOL/L (ref 98–107)
CO2 SERPL-SCNC: 28 MMOL/L (ref 22–29)
COLOR UR AUTO: ABNORMAL
CREAT SERPL-MCNC: 0.99 MG/DL (ref 0.73–1.18)
CREAT/UREA NIT SERPL: 8
EOSINOPHIL # BLD AUTO: 0.18 X10(3)/MCL (ref 0–0.9)
EOSINOPHIL NFR BLD AUTO: 1.5 %
ERYTHROCYTE [DISTWIDTH] IN BLOOD BY AUTOMATED COUNT: 14.9 % (ref 11.5–17)
GLUCOSE SERPL-MCNC: 104 MG/DL (ref 74–100)
GLUCOSE UR QL STRIP.AUTO: NEGATIVE MG/DL
HCT VFR BLD AUTO: 28.4 % (ref 42–52)
HGB BLD-MCNC: 8.9 GM/DL (ref 14–18)
IMM GRANULOCYTES # BLD AUTO: 0.17 X10(3)/MCL (ref 0–0.04)
IMM GRANULOCYTES NFR BLD AUTO: 1.4 %
KETONES UR QL STRIP.AUTO: 1 MG/DL
LEUKOCYTE ESTERASE UR QL STRIP.AUTO: 1 UNIT/L
LYMPHOCYTES # BLD AUTO: 1.72 X10(3)/MCL (ref 0.6–4.6)
LYMPHOCYTES NFR BLD AUTO: 14.2 %
MAGNESIUM SERPL-MCNC: 2.1 MG/DL (ref 1.6–2.6)
MCH RBC QN AUTO: 28.5 PG (ref 27–31)
MCHC RBC AUTO-ENTMCNC: 31.3 MG/DL (ref 33–36)
MCV RBC AUTO: 91 FL (ref 80–94)
MONOCYTES # BLD AUTO: 0.87 X10(3)/MCL (ref 0.1–1.3)
MONOCYTES NFR BLD AUTO: 7.2 %
NEUTROPHILS # BLD AUTO: 9.1 X10(3)/MCL (ref 2.1–9.2)
NEUTROPHILS NFR BLD AUTO: 75.4 %
NITRITE UR QL STRIP.AUTO: POSITIVE
NRBC BLD AUTO-RTO: 0 %
PH UR STRIP.AUTO: 7 [PH]
PHOSPHATE SERPL-MCNC: 3.4 MG/DL (ref 2.3–4.7)
PLATELET # BLD AUTO: 218 X10(3)/MCL (ref 130–400)
PMV BLD AUTO: 10 FL (ref 7.4–10.4)
POCT GLUCOSE: 149 MG/DL (ref 70–110)
POTASSIUM SERPL-SCNC: 3.7 MMOL/L (ref 3.5–5.1)
PROT UR QL STRIP.AUTO: 3 MG/DL
RBC # BLD AUTO: 3.12 X10(6)/MCL (ref 4.7–6.1)
RBC #/AREA URNS AUTO: 30 /HPF
RBC UR QL AUTO: 3 UNIT/L
SODIUM SERPL-SCNC: 147 MMOL/L (ref 136–145)
SP GR UR STRIP.AUTO: 1.01 (ref 1–1.03)
SQUAMOUS #/AREA URNS AUTO: <5 /HPF
UROBILINOGEN UR STRIP-ACNC: 2 MG/DL
WBC # SPEC AUTO: 12.1 X10(3)/MCL (ref 4.5–11.5)
WBC #/AREA URNS AUTO: 6 /HPF

## 2022-07-02 PROCEDURE — 94799 UNLISTED PULMONARY SVC/PX: CPT

## 2022-07-02 PROCEDURE — 80048 BASIC METABOLIC PNL TOTAL CA: CPT | Performed by: NEUROLOGICAL SURGERY

## 2022-07-02 PROCEDURE — 85025 COMPLETE CBC W/AUTO DIFF WBC: CPT | Performed by: NEUROLOGICAL SURGERY

## 2022-07-02 PROCEDURE — 25000003 PHARM REV CODE 250: Performed by: NEUROLOGICAL SURGERY

## 2022-07-02 PROCEDURE — 20800000 HC ICU TRAUMA

## 2022-07-02 PROCEDURE — 83735 ASSAY OF MAGNESIUM: CPT | Performed by: STUDENT IN AN ORGANIZED HEALTH CARE EDUCATION/TRAINING PROGRAM

## 2022-07-02 PROCEDURE — 63600175 PHARM REV CODE 636 W HCPCS

## 2022-07-02 PROCEDURE — 25000003 PHARM REV CODE 250

## 2022-07-02 PROCEDURE — 97164 PT RE-EVAL EST PLAN CARE: CPT

## 2022-07-02 PROCEDURE — 25000003 PHARM REV CODE 250: Performed by: SURGERY

## 2022-07-02 PROCEDURE — 81001 URINALYSIS AUTO W/SCOPE: CPT | Performed by: STUDENT IN AN ORGANIZED HEALTH CARE EDUCATION/TRAINING PROGRAM

## 2022-07-02 PROCEDURE — 63600175 PHARM REV CODE 636 W HCPCS: Performed by: SURGERY

## 2022-07-02 PROCEDURE — 84100 ASSAY OF PHOSPHORUS: CPT | Performed by: STUDENT IN AN ORGANIZED HEALTH CARE EDUCATION/TRAINING PROGRAM

## 2022-07-02 PROCEDURE — 36415 COLL VENOUS BLD VENIPUNCTURE: CPT | Performed by: NEUROLOGICAL SURGERY

## 2022-07-02 PROCEDURE — 63600175 PHARM REV CODE 636 W HCPCS: Performed by: STUDENT IN AN ORGANIZED HEALTH CARE EDUCATION/TRAINING PROGRAM

## 2022-07-02 PROCEDURE — C9113 INJ PANTOPRAZOLE SODIUM, VIA: HCPCS | Performed by: STUDENT IN AN ORGANIZED HEALTH CARE EDUCATION/TRAINING PROGRAM

## 2022-07-02 RX ADMIN — CEFAZOLIN SODIUM 2000 MG: 2 INJECTION, SOLUTION INTRAVENOUS at 08:07

## 2022-07-02 RX ADMIN — HYDROCODONE BITARTRATE AND ACETAMINOPHEN 1 TABLET: 5; 325 TABLET ORAL at 08:07

## 2022-07-02 RX ADMIN — HYDROCODONE BITARTRATE AND ACETAMINOPHEN 1 TABLET: 5; 325 TABLET ORAL at 02:07

## 2022-07-02 RX ADMIN — LEVETIRACETAM 500 MG: 100 INJECTION, SOLUTION INTRAVENOUS at 08:07

## 2022-07-02 RX ADMIN — LEVETIRACETAM 500 MG: 100 INJECTION, SOLUTION INTRAVENOUS at 12:07

## 2022-07-02 RX ADMIN — HYDROCODONE BITARTRATE AND ACETAMINOPHEN 1 TABLET: 5; 325 TABLET ORAL at 11:07

## 2022-07-02 RX ADMIN — ENOXAPARIN SODIUM 120 MG: 150 INJECTION SUBCUTANEOUS at 08:07

## 2022-07-02 RX ADMIN — QUETIAPINE FUMARATE 50 MG: 25 TABLET ORAL at 08:07

## 2022-07-02 RX ADMIN — PANTOPRAZOLE SODIUM 40 MG: 40 INJECTION, POWDER, FOR SOLUTION INTRAVENOUS at 08:07

## 2022-07-02 NOTE — PT/OT/SLP RE-EVAL
Physical Therapy Re-evaluation    Patient Name:  Alexander Ortiz   MRN:  [id number]    Recommendations:     Discharge Recommendations:  rehabilitation facility   Discharge Equipment Recommendations:  (pending progress)   Barriers to discharge: medical diagnosis; decreased functional mobility/independence    Assessment:     Alexander Ortiz is a 46 y.o. male admitted with a medical diagnosis of frontal head injury s/p bifrontal craniotomy with sinus repair; B pulmonary embolism; IVC filter; L hand fx s/p closed reduction percutaneous pinning L 4/5 CMC (7/1).  He presents with the following impairments/functional limitations:  weakness, gait instability, impaired endurance, impaired balance, decreased lower extremity function, decreased upper extremity function, decreased safety awareness, impaired self care skills, impaired functional mobilty.    Rehab Prognosis:  good; patient would benefit from acute skilled PT services to address these deficits and reach maximum level of function.      Recent Surgery: Procedure(s) (LRB):  ORIF, HAND (Left) 1 Day Post-Op    Plan:     During this hospitalization, patient to be seen daily to address the above listed problems via gait training, therapeutic activities, therapeutic exercises  · Plan of Care Expires:  08/19/22   Plan of Care Reviewed with: patient    Subjective     Communicated with NSG prior to session.  Patient found HOB elevated with blood pressure cuff, pulse ox (continuous), telemetry upon PT entry to room, agreeable to evaluation.      Chief Complaint: n/a  Patient comments/goals: to move better and go home  Pain/Comfort:  · Pain Rating 1: 2/10  · Location - Side 1: Left  · Location - Orientation 1:  (only when extending fingers)  · Location 1: hand    Patients cultural, spiritual, Bahai conflicts given the current situation: no      Objective:     Patient found with: blood pressure cuff, pulse ox (continuous), telemetry     General Precautions: Standard, fall    Orthopedic Precautions:LUE non weight bearing   Braces: N/A  Respiratory Status: oxymask 2L/min  Vitals   At rest   BP= 126/66   HR=68   SpO2= 100%     With activity   BP= 140/82  Exams:  · Cognitive Exam:  Patient is oriented to Person, Place and Time    · RLE Strength: WFL  · LLE Strength: WFL    Functional Mobility:  · Bed Mobility:     · Supine to Sit: minimum assistance  · Transfers:     · Sit to Stand:  contact guard assistance with hand-held assist  · Gait: 60 feet with use of R HHA; slowed pace; step-to pattern; demo'd poor balance however no significnat LOB noted    Functional mobility limited 2/2 pt with complaints of fatigue and dizziness vitals were assessed throughout and WNL    Patient left up in chair with all lines intact, call button in reach, chair alarm on and RN notified.    GOALS:   Multidisciplinary Problems     Physical Therapy Goals        Problem: Physical Therapy    Goal Priority Disciplines Outcome Goal Variances Interventions   Physical Therapy Goal     PT, PT/OT Ongoing, Progressing     Description: Goals to be met by: 22     Patient will increase functional independence with mobility by performin. Supine to sit with [place]  2. Sit to supine with [place]  3. Sit to stand transfer with [place]  4. Gait  x 200 feet with Modified [place] using Quad Cane vs none/LRAD.                      History:     History reviewed. No pertinent past medical history.    History reviewed. No pertinent surgical history.    Time Tracking:     PT Received On: 22  PT Start Time: 1012     PT Stop Time: 1031  PT Total Time (min): 19 min     Billable Minutes: Re-chandni 2022

## 2022-07-02 NOTE — PROGRESS NOTES
[address]  Orthopedics  Progress Note    Patient Name: Alexander Ortiz  MRN: [id number]  Admission Date: 6/23/2022  Hospital Length of Stay: 9 days  Attending Provider: Maury Quinones Jr., MD  Primary Care Provider: Primary Doctor No  Follow-up For: Procedure(s) (LRB):  ORIF, HAND (Left)    Post-Operative Day: 1 Day Post-Op  Subjective:     Principal Problem:Trauma    Principal Orthopedic Problem: left hand injury    Interval History: POD 1 Closed reduction percutaneous pinning L 4/5 MC fx/ d/l and hamate fx as well as Closed management of L 4th proximal phalanx base fracture by Dr. Moran. No acute ortho events overnight. Pain is controlled with meds. No new ortho complaints this morning.     Review of patient's allergies indicates:  No Known Allergies    Current Facility-Administered Medications   Medication    0.9%  NaCl infusion (for blood administration)    0.9%  NaCl infusion    acetaminophen suppository 650 mg    ceFAZolin in dextrose (iso-os) 2 gram/100 mL PgBk 2,000 mg    dextrose 10% bolus 125 mL    dextrose 10% bolus 250 mL    enalaprilat injection 1.25 mg    enoxaparin injection 120 mg    hydrALAZINE injection 10 mg    HYDROcodone-acetaminophen  mg per tablet 1 tablet    HYDROcodone-acetaminophen 5-325 mg per tablet 1 tablet    ketorolac injection 15 mg    levETIRAcetam (KEPPRA) 500 mg in dextrose 5 % in water (D5W) 5 % 100 mL IVPB (MB+)    ondansetron injection 4 mg    pantoprazole injection 40 mg    prochlorperazine injection Soln 5 mg    QUEtiapine tablet 50 mg    sodium chloride 0.9% flush 10 mL     Objective:     Vital Signs (Most Recent):  Temp: 97.9 °F (36.6 °C) (07/02/22 0415)  Pulse: 71 (07/02/22 0600)  Resp: (!) 22 (07/02/22 0600)  BP: 92/66 (07/02/22 0600)  SpO2: 99 % (07/02/22 0600) Vital Signs (24h Range):  Temp:  [97.7 °F (36.5 °C)-98 °F (36.7 °C)] 97.9 °F (36.6 °C)  Pulse:  [55-86] 71  Resp:  [13-33] 22  SpO2:  [95 %-100 %] 99  "%  BP: ()/() 92/66     Weight: 120 kg (264 lb 8.8 oz)  Height: 5' 9.02" (175.3 cm)  Body mass index is 39.05 kg/m².      Intake/Output Summary (Last 24 hours) at 7/2/2022 0932  Last data filed at 7/2/2022 0800  Gross per 24 hour   Intake 2478 ml   Output 2675 ml   Net -197 ml       Ortho/SPM Exam   General: AAOx4. Well nourished, well perfused, no distress.   L UE  Splint C/D/I  Stiffness/soreness with ROM of the digits but motor intact  BCR distally  SLT intact distally    Significant Labs:   Recent Lab Results       07/02/22  0222   07/01/22  1850   07/01/22  1231        Anion Gap 8.0           Baso # 0.04           Basophil % 0.3           BUN 8.4           BUN/CREAT RATIO 8           Calcium 8.1           Chloride 111           CO2 28           Creatinine 0.99           eGFR if non  >60           Eos # 0.18           Eosinophil % 1.5           Glucose 104           Hematocrit 28.4           Hemoglobin 8.9           Immature Grans (Abs) 0.17           Immature Granulocytes 1.4           Lymph # 1.72           LYMPH % 14.2           Magnesium 2.10           MCH 28.5           MCHC 31.3           MCV 91.0           Mono # 0.87           Mono % 7.2           MPV 10.0           Neut # 9.1           Neut % 75.4           nRBC 0.0           Phosphorus 3.4           Platelets 218           POCT Glucose   149   268       Potassium 3.7           RBC 3.12           RDW 14.9           Sodium 147           WBC 12.1                 Significant Imaging: None    Assessment/Plan:     Active Diagnoses:    Diagnosis Date Noted POA    PRINCIPAL PROBLEM:  Trauma [T14.90XA] 06/28/2022 Unknown      Problems Resolved During this Admission:     Pt doing well this morning. Keep splint to L UE clean, dry, and intact. NWB L hand. Ok WB to elbow/forearm. Elevate/ice for swelling. Continue pain control. Follow up with Dr. Moran in 2 weeks. Please call with any questions/concerns.     The above findings, " diagnosis, and treatment plan were discussed with Dr. Juan J Gray who is in agreement.      ANIRUDH Rudolph  Orthopedics  [facility] - [address] [place] [address]
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
## 2022-07-02 NOTE — PLAN OF CARE
Problem: Physical Therapy  Goal: Physical Therapy Goal  Description: Goals to be met by: 22   Re-eval completed; goals updated to reflects pt LOF    Patient will increase functional independence with mobility by performin. Supine to sit with [place]  2. Sit to supine with [place]  3. Sit to stand transfer with [place]  4. Gait  x 200 feet with Modified [place] using Quad Cane vs none/LRAD.     Outcome: Ongoing, Progressing

## 2022-07-02 NOTE — PROGRESS NOTES
Doing well  Vital signs stable  Surgical incision clean dry and intact- soft  Neurologically intact- follows all commands. Awake alert oriented appropriate  Good strength UE and LE- left UE in splint    Tolerating food.     Plan:       Downgrade  PTOT  SCDs  Seizure precautions on Keppra.  Mobilization  Fall precautions  Call if needed

## 2022-07-03 LAB
MAGNESIUM SERPL-MCNC: 2.1 MG/DL (ref 1.6–2.6)
PHOSPHATE SERPL-MCNC: 2.3 MG/DL (ref 2.3–4.7)

## 2022-07-03 PROCEDURE — 25000003 PHARM REV CODE 250: Performed by: SURGERY

## 2022-07-03 PROCEDURE — 63600175 PHARM REV CODE 636 W HCPCS

## 2022-07-03 PROCEDURE — 84100 ASSAY OF PHOSPHORUS: CPT | Performed by: STUDENT IN AN ORGANIZED HEALTH CARE EDUCATION/TRAINING PROGRAM

## 2022-07-03 PROCEDURE — 36415 COLL VENOUS BLD VENIPUNCTURE: CPT | Performed by: STUDENT IN AN ORGANIZED HEALTH CARE EDUCATION/TRAINING PROGRAM

## 2022-07-03 PROCEDURE — 25000003 PHARM REV CODE 250: Performed by: NEUROLOGICAL SURGERY

## 2022-07-03 PROCEDURE — 83735 ASSAY OF MAGNESIUM: CPT | Performed by: STUDENT IN AN ORGANIZED HEALTH CARE EDUCATION/TRAINING PROGRAM

## 2022-07-03 PROCEDURE — C9113 INJ PANTOPRAZOLE SODIUM, VIA: HCPCS | Performed by: STUDENT IN AN ORGANIZED HEALTH CARE EDUCATION/TRAINING PROGRAM

## 2022-07-03 PROCEDURE — 11000001 HC ACUTE MED/SURG PRIVATE ROOM

## 2022-07-03 PROCEDURE — 63600175 PHARM REV CODE 636 W HCPCS: Performed by: STUDENT IN AN ORGANIZED HEALTH CARE EDUCATION/TRAINING PROGRAM

## 2022-07-03 PROCEDURE — 99900035 HC TECH TIME PER 15 MIN (STAT)

## 2022-07-03 PROCEDURE — 63600175 PHARM REV CODE 636 W HCPCS: Performed by: SURGERY

## 2022-07-03 PROCEDURE — 27000221 HC OXYGEN, UP TO 24 HOURS

## 2022-07-03 PROCEDURE — 25000003 PHARM REV CODE 250

## 2022-07-03 RX ADMIN — HYDROCODONE BITARTRATE AND ACETAMINOPHEN 1 TABLET: 10; 325 TABLET ORAL at 02:07

## 2022-07-03 RX ADMIN — HYDROCODONE BITARTRATE AND ACETAMINOPHEN 1 TABLET: 10; 325 TABLET ORAL at 09:07

## 2022-07-03 RX ADMIN — ENOXAPARIN SODIUM 120 MG: 150 INJECTION SUBCUTANEOUS at 09:07

## 2022-07-03 RX ADMIN — QUETIAPINE FUMARATE 50 MG: 25 TABLET ORAL at 09:07

## 2022-07-03 RX ADMIN — PANTOPRAZOLE SODIUM 40 MG: 40 INJECTION, POWDER, FOR SOLUTION INTRAVENOUS at 09:07

## 2022-07-03 RX ADMIN — LEVETIRACETAM 500 MG: 100 INJECTION, SOLUTION INTRAVENOUS at 09:07

## 2022-07-03 RX ADMIN — SODIUM CHLORIDE: 9 INJECTION, SOLUTION INTRAVENOUS at 01:07

## 2022-07-03 NOTE — PLAN OF CARE
Problem: Adult Inpatient Plan of Care  Goal: Plan of Care Review  Outcome: Ongoing, Progressing  Goal: Patient-Specific Goal (Individualized)  Outcome: Ongoing, Progressing     Problem: Infection  Goal: Absence of Infection Signs and Symptoms  Outcome: Ongoing, Progressing     Problem: Skin Injury Risk Increased  Goal: Skin Health and Integrity  Outcome: Ongoing, Progressing     Problem: Fall Injury Risk  Goal: Absence of Fall and Fall-Related Injury  Outcome: Ongoing, Progressing
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
## 2022-07-03 NOTE — PLAN OF CARE
Problem: Adult Inpatient Plan of Care  Goal: Plan of Care Review  Outcome: Ongoing, Progressing  Goal: Patient-Specific Goal (Individualized)  Outcome: Ongoing, Progressing  Goal: Absence of Hospital-Acquired Illness or Injury  Outcome: Ongoing, Progressing  Goal: Optimal Comfort and Wellbeing  Outcome: Ongoing, Progressing  Goal: Readiness for Transition of Care  Outcome: Ongoing, Progressing     Problem: Infection  Goal: Absence of Infection Signs and Symptoms  Outcome: Ongoing, Progressing     Problem: Impaired Wound Healing  Goal: Optimal Wound Healing  Outcome: Ongoing, Progressing     Problem: Skin Injury Risk Increased  Goal: Skin Health and Integrity  Outcome: Ongoing, Progressing     Problem: Fall Injury Risk  Goal: Absence of Fall and Fall-Related Injury  Outcome: Ongoing, Progressing

## 2022-07-04 LAB
ANION GAP SERPL CALC-SCNC: 8 MEQ/L
BASOPHILS # BLD AUTO: 0.07 X10(3)/MCL (ref 0–0.2)
BASOPHILS NFR BLD AUTO: 0.6 %
BUN SERPL-MCNC: 7.4 MG/DL (ref 8.9–20.6)
CALCIUM SERPL-MCNC: 8.8 MG/DL (ref 8.4–10.2)
CHLORIDE SERPL-SCNC: 108 MMOL/L (ref 98–107)
CO2 SERPL-SCNC: 30 MMOL/L (ref 22–29)
CREAT SERPL-MCNC: 0.88 MG/DL (ref 0.73–1.18)
CREAT/UREA NIT SERPL: 8
EOSINOPHIL # BLD AUTO: 0.32 X10(3)/MCL (ref 0–0.9)
EOSINOPHIL NFR BLD AUTO: 2.9 %
ERYTHROCYTE [DISTWIDTH] IN BLOOD BY AUTOMATED COUNT: 14.7 % (ref 11.5–17)
GLUCOSE SERPL-MCNC: 96 MG/DL (ref 74–100)
HCT VFR BLD AUTO: 29.8 % (ref 42–52)
HGB BLD-MCNC: 9.8 GM/DL (ref 14–18)
IMM GRANULOCYTES # BLD AUTO: 0.13 X10(3)/MCL (ref 0–0.04)
IMM GRANULOCYTES NFR BLD AUTO: 1.2 %
LYMPHOCYTES # BLD AUTO: 1.57 X10(3)/MCL (ref 0.6–4.6)
LYMPHOCYTES NFR BLD AUTO: 14 %
MCH RBC QN AUTO: 28.8 PG (ref 27–31)
MCHC RBC AUTO-ENTMCNC: 32.9 MG/DL (ref 33–36)
MCV RBC AUTO: 87.6 FL (ref 80–94)
MONOCYTES # BLD AUTO: 0.71 X10(3)/MCL (ref 0.1–1.3)
MONOCYTES NFR BLD AUTO: 6.3 %
NEUTROPHILS # BLD AUTO: 8.4 X10(3)/MCL (ref 2.1–9.2)
NEUTROPHILS NFR BLD AUTO: 75 %
NRBC BLD AUTO-RTO: 0 %
PLATELET # BLD AUTO: 222 X10(3)/MCL (ref 130–400)
PMV BLD AUTO: 9.9 FL (ref 7.4–10.4)
POTASSIUM SERPL-SCNC: 3.4 MMOL/L (ref 3.5–5.1)
RBC # BLD AUTO: 3.4 X10(6)/MCL (ref 4.7–6.1)
SODIUM SERPL-SCNC: 146 MMOL/L (ref 136–145)
WBC # SPEC AUTO: 11.2 X10(3)/MCL (ref 4.5–11.5)

## 2022-07-04 PROCEDURE — 94761 N-INVAS EAR/PLS OXIMETRY MLT: CPT

## 2022-07-04 PROCEDURE — 97168 OT RE-EVAL EST PLAN CARE: CPT

## 2022-07-04 PROCEDURE — 80048 BASIC METABOLIC PNL TOTAL CA: CPT | Performed by: STUDENT IN AN ORGANIZED HEALTH CARE EDUCATION/TRAINING PROGRAM

## 2022-07-04 PROCEDURE — 63600175 PHARM REV CODE 636 W HCPCS

## 2022-07-04 PROCEDURE — 36415 COLL VENOUS BLD VENIPUNCTURE: CPT | Performed by: STUDENT IN AN ORGANIZED HEALTH CARE EDUCATION/TRAINING PROGRAM

## 2022-07-04 PROCEDURE — 11000001 HC ACUTE MED/SURG PRIVATE ROOM

## 2022-07-04 PROCEDURE — 25000003 PHARM REV CODE 250: Performed by: SURGERY

## 2022-07-04 PROCEDURE — 63600175 PHARM REV CODE 636 W HCPCS: Performed by: SURGERY

## 2022-07-04 PROCEDURE — 97530 THERAPEUTIC ACTIVITIES: CPT

## 2022-07-04 PROCEDURE — C9113 INJ PANTOPRAZOLE SODIUM, VIA: HCPCS | Performed by: STUDENT IN AN ORGANIZED HEALTH CARE EDUCATION/TRAINING PROGRAM

## 2022-07-04 PROCEDURE — 27000221 HC OXYGEN, UP TO 24 HOURS

## 2022-07-04 PROCEDURE — 25000003 PHARM REV CODE 250

## 2022-07-04 PROCEDURE — 85025 COMPLETE CBC W/AUTO DIFF WBC: CPT | Performed by: STUDENT IN AN ORGANIZED HEALTH CARE EDUCATION/TRAINING PROGRAM

## 2022-07-04 PROCEDURE — 25000003 PHARM REV CODE 250: Performed by: NEUROLOGICAL SURGERY

## 2022-07-04 PROCEDURE — 63600175 PHARM REV CODE 636 W HCPCS: Performed by: STUDENT IN AN ORGANIZED HEALTH CARE EDUCATION/TRAINING PROGRAM

## 2022-07-04 RX ADMIN — HYDROCODONE BITARTRATE AND ACETAMINOPHEN 1 TABLET: 10; 325 TABLET ORAL at 06:07

## 2022-07-04 RX ADMIN — PANTOPRAZOLE SODIUM 40 MG: 40 INJECTION, POWDER, FOR SOLUTION INTRAVENOUS at 08:07

## 2022-07-04 RX ADMIN — ENOXAPARIN SODIUM 120 MG: 150 INJECTION SUBCUTANEOUS at 08:07

## 2022-07-04 RX ADMIN — HYDROCODONE BITARTRATE AND ACETAMINOPHEN 1 TABLET: 10; 325 TABLET ORAL at 08:07

## 2022-07-04 RX ADMIN — QUETIAPINE FUMARATE 50 MG: 25 TABLET ORAL at 10:07

## 2022-07-04 RX ADMIN — HYDROCODONE BITARTRATE AND ACETAMINOPHEN 1 TABLET: 10; 325 TABLET ORAL at 01:07

## 2022-07-04 RX ADMIN — LEVETIRACETAM 500 MG: 100 INJECTION, SOLUTION INTRAVENOUS at 10:07

## 2022-07-04 RX ADMIN — ENOXAPARIN SODIUM 120 MG: 150 INJECTION SUBCUTANEOUS at 10:07

## 2022-07-04 RX ADMIN — LEVETIRACETAM 500 MG: 100 INJECTION, SOLUTION INTRAVENOUS at 08:07
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
## 2022-07-04 NOTE — PT/OT/SLP PROGRESS
Physical Therapy         Treatment        Alexander Ortiz   MRN: [id number]     PT Received On: 07/04/22  PT Start Time: 1235     PT Stop Time: 1250    PT Total Time (min): 15 min       Billable Minutes:  Therapeutic Activity 15  Total Minutes: 15    Treatment Type: Treatment  PT/PTA: PT     PTA Visit Number: 1       General Precautions: Standard, fall  Orthopedic Precautions: Orthopedic Precautions : LUE non weight bearing-- required reiterated education for proper adherence towards NWB of L hand   Braces: Braces: N/A    Spiritual, Cultural Beliefs, Hindu Practices, Values that Affect Care: no    Subjective:  Communicated with NSG prior to session.    Pain/Comfort  Pain Rating 1: 0/10    Objective:  Patient found lying in bed    Functional Mobility:  Bed Mobility:   Supine to sit: Standby Assistance   Sit to supine: Standby Assistance    Transfer Training:   Sit to stand:Stand-by Assistance with No Assistive Device x1 from EOB + x1 from toilet    Gait Training:   Pt ambulated 115 feet without use of AD; slow step through pattern; have 1 bout of LOB requiring Alex for stability   Cognitive tasks attempted while ambulating; unable to consistently answer questions appropriately--however this could have been because of language  barrier (pt speaks english but primary language is Latvian)      Additional info:  Gait training/tx session limited 2/2 pt with urgent need for BM.       Activity Tolerance:  Patient tolerated treatment well    Patient left HOB elevated with all lines intact, call button in reach and bed alarm on.    Assessment:  Alexander Ortiz is a 46 y.o. male with a medical diagnosis of frontal head injury s/p bifrontal craniotomy with sinus repair; B pulmonary embolism; IVC filter; L hand fx s/p closed reduction percutaneous pinning L 4/5 CMC.     Rehab potential is excellent.    Activity tolerance: Excellent    Discharge recommendations: Discharge Facility/Level of Care Needs: rehabilitation  facility/TCU    Equipment recommendations: Equipment Needed After Discharge: none     GOALS:   Multidisciplinary Problems     Physical Therapy Goals        Problem: Physical Therapy    Goal Priority Disciplines Outcome Goal Variances Interventions   Physical Therapy Goal     PT, PT/OT Ongoing, Progressing     Description: Goals to be met by: 22     Patient will increase functional independence with mobility by performin. Supine to sit with [place]  2. Sit to supine with [place]  3. Sit to stand transfer with [place]  4. Gait  x 200 feet with Modified [place] using Quad Cane vs none/LRAD.                      PLAN:    Patient to be seen daily  to address the above listed problems via gait training, therapeutic activities, therapeutic exercises  Plan of Care expires: 22  Plan of Care reviewed with: patient         2022
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
## 2022-07-05 PROCEDURE — 25000003 PHARM REV CODE 250

## 2022-07-05 PROCEDURE — 25000003 PHARM REV CODE 250: Performed by: SURGERY

## 2022-07-05 PROCEDURE — 25000003 PHARM REV CODE 250: Performed by: NEUROLOGICAL SURGERY

## 2022-07-05 PROCEDURE — 97116 GAIT TRAINING THERAPY: CPT

## 2022-07-05 PROCEDURE — C9113 INJ PANTOPRAZOLE SODIUM, VIA: HCPCS | Performed by: STUDENT IN AN ORGANIZED HEALTH CARE EDUCATION/TRAINING PROGRAM

## 2022-07-05 PROCEDURE — 63600175 PHARM REV CODE 636 W HCPCS

## 2022-07-05 PROCEDURE — 63600175 PHARM REV CODE 636 W HCPCS: Performed by: SURGERY

## 2022-07-05 PROCEDURE — 63600175 PHARM REV CODE 636 W HCPCS: Performed by: STUDENT IN AN ORGANIZED HEALTH CARE EDUCATION/TRAINING PROGRAM

## 2022-07-05 PROCEDURE — 11000001 HC ACUTE MED/SURG PRIVATE ROOM

## 2022-07-05 RX ORDER — POLYETHYLENE GLYCOL 3350 17 G/17G
17 POWDER, FOR SOLUTION ORAL 2 TIMES DAILY
Status: DISCONTINUED | OUTPATIENT
Start: 2022-07-05 | End: 2022-07-11 | Stop reason: HOSPADM

## 2022-07-05 RX ORDER — LEVETIRACETAM 500 MG/1
500 TABLET ORAL 2 TIMES DAILY
Status: DISCONTINUED | OUTPATIENT
Start: 2022-07-05 | End: 2022-07-07

## 2022-07-05 RX ADMIN — DOCUSATE SODIUM 50 MG: 50 CAPSULE, LIQUID FILLED ORAL at 10:07

## 2022-07-05 RX ADMIN — PANTOPRAZOLE SODIUM 40 MG: 40 INJECTION, POWDER, FOR SOLUTION INTRAVENOUS at 09:07

## 2022-07-05 RX ADMIN — HYDROCODONE BITARTRATE AND ACETAMINOPHEN 1 TABLET: 10; 325 TABLET ORAL at 05:07

## 2022-07-05 RX ADMIN — ENOXAPARIN SODIUM 120 MG: 150 INJECTION SUBCUTANEOUS at 09:07

## 2022-07-05 RX ADMIN — QUETIAPINE FUMARATE 50 MG: 25 TABLET ORAL at 10:07

## 2022-07-05 RX ADMIN — ENOXAPARIN SODIUM 120 MG: 150 INJECTION SUBCUTANEOUS at 10:07

## 2022-07-05 RX ADMIN — POLYETHYLENE GLYCOL 3350 17 G: 17 POWDER, FOR SOLUTION ORAL at 10:07

## 2022-07-05 RX ADMIN — LEVETIRACETAM 500 MG: 100 INJECTION, SOLUTION INTRAVENOUS at 09:07

## 2022-07-05 RX ADMIN — LEVETIRACETAM 500 MG: 500 TABLET, FILM COATED ORAL at 10:07

## 2022-07-05 NOTE — PLAN OF CARE
Spoke with Alejo Yoder the  for [employer]. His email is   [email or web address]  Phone number is  [phone].   Updated him on recommendations from therapy.    Austin updated me that therapy felt he could go home if someone was with him 24/7 and would feed him.   Based on info I am getting this is not an option. Everyone is working different schedules. Inpt acute rehab in my opinion is the safest dc plan.   I gave Alejo Yoder the info from [facility] about wanting [facility]  inpt rehab on [address]. He requested info be emailed to him which I sent the list with the note of preference. He then will send it on to someone else to review and if approved negiotate a price.  Will follow up with workmans comp

## 2022-07-05 NOTE — PLAN OF CARE
Problem: Adult Inpatient Plan of Care  Goal: Plan of Care Review  Outcome: Ongoing, Progressing  Flowsheets (Taken 7/5/2022 1758)  Plan of Care Reviewed With: patient  Goal: Optimal Comfort and Wellbeing  Outcome: Ongoing, Progressing  Intervention: Monitor Pain and Promote Comfort  Flowsheets (Taken 7/5/2022 1758)  Pain Management Interventions:   medication offered   pain management plan reviewed with patient/caregiver   position adjusted   pillow support provided  Intervention: Provide Person-Centered Care  Flowsheets (Taken 7/5/2022 1758)  Trust Relationship/Rapport: care explained     Problem: Infection  Goal: Absence of Infection Signs and Symptoms  Outcome: Ongoing, Progressing  Intervention: Prevent or Manage Infection  Flowsheets (Taken 7/5/2022 1758)  Fever Reduction/Comfort Measures:   lightweight clothing   fluid intake increased  Infection Management: aseptic technique maintained     Problem: Skin Injury Risk Increased  Goal: Skin Health and Integrity  Outcome: Ongoing, Progressing  Intervention: Optimize Skin Protection  Flowsheets (Taken 7/5/2022 1758)  Pressure Reduction Techniques:   frequent weight shift encouraged   weight shift assistance provided  Pressure Reduction Devices: alternating pressure pump (ADD)  Skin Protection: adhesive use limited  Head of Bed (HOB) Positioning: HOB at 30-45 degrees

## 2022-07-05 NOTE — PROGRESS NOTES
"Nutrition   Progress Note      Recommendations:  Continue current diet as tolerated.      Reason for Evaluation:  RD follow up    Diagnosis:    1. Closed fracture of left hand, initial encounter    2. Trauma    3. (HFpEF) heart failure with preserved ejection fraction    4. Pulmonary emboli    5. DVT (deep venous thrombosis)        Relevant Medical History:  History reviewed. No pertinent past medical history.      Nutrition Diet History:    Factors affecting nutritional intake: none identified at this time      Nutrition Prescription Ordered:    Current Diet Order: Soft and Bite Sized, Mildly Thick Liquids    Appetite:  Good (> 75% - 100% po intake)    PO intake: NPO      Labs / Medications / Procedures:    Nutrition Related Medications: NS @ 150ml/hr    Nutrition Related Labs:  7/4 Na 146, K 3.4, Cl 108, BUN 7.4      Anthropometrics:  Height: 5' 9.02" (1.753 m)  Admit Weight:  Weight: 120 kg (264 lb 8.8 oz)  Latest Weight:  120 kg (264 lb 8.8 oz)    Wt Readings from Last 5 Encounters:   06/23/22 120 kg (264 lb 8.8 oz)     IBW: 72.72kg  %IBW: 165%  Body mass index is 39.05 kg/m².  BMI classification:  Obese Grade II (BMI 35 - 39.9)      Nutrition Narrative:  Diet recently advanced per SLP. Pt with good appetite prior to diet advancement. Received some tube feeding prior to extubation.    Monitoring and Evaluation:    Nutrition Monitoring and Evaluation:  food and beverage intake    Nutrition Risk:  Level of Nutrition Risk:  Low  Frequency of Follow up:  Dietitian will f/up within 7 days.            "
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
## 2022-07-05 NOTE — PLAN OF CARE
Email from Alejo Yoder the  indicates he also needs an order from  that inpt rehab is needed. This sent.   I did talk with [facility] who confirms there is no one that is able to be with pt 24/7 and help feed him etc.   I have sent referral to [facility] so they will have info needed should/when workmans comp contacts them

## 2022-07-05 NOTE — PT/OT/SLP PROGRESS
Physical Therapy         Treatment        Alexander Ortiz   MRN: [id number]     PT Received On: 07/05/22  PT Start Time: 0932     PT Stop Time: 0948    PT Total Time (min): 16 min       Billable Minutes:  Gait [id number]  Total Minutes: 16    Treatment Type: Treatment  PT/PTA: PT     PTA Visit Number: 2       General Precautions: Standard,  (SBP < 140)  Orthopedic Precautions: Orthopedic Precautions : LUE non weight bearing   Braces: Braces: N/A    Spiritual, Cultural Beliefs, Evangelical Practices, Values that Affect Care: no    Subjective:  Communicated with NSG prior to session.    Pain/Comfort  Pain Rating 1: 0/10    Objective:  Patient found resting in bed    Functional Mobility:  Bed Mobility:   Supine to sit: Supervision or Set-up Assistance   Sit to supine: Supervision or Set-up Assistance    Transfer Training:  Sit to stand:Supervision or Set-up Assistance with No Assistive Device x1 fromEOB    Gait Training:   Pt ambulated approx 350 feet without use of an AD; demo'd improved overall stability as compared to previous sessions, no LOB; able to conversate, scan  env, and recall throughout; slowed pace; CGA for safety    Activity Tolerance:  Patient tolerated treatment well    Patient left HOB elevated with all lines intact and call button in reach.    Assessment:  Alexander Ortiz is a 46 y.o. male with a medical diagnosis of frontal head injury s/p bifrontal craniotomy with sinus repair; B pulmonary embolism; IVC filter; L hand fx s/p closed reduction percutaneous pinning L 4/5 CMC    Pt appears to be doing better. Still some concern for cognitive deficits however could be related to language barrier difficulties.     Rehab potential is excellent.    Activity tolerance: Excellent    Discharge recommendations: Discharge Facility/Level of Care Needs: rehabilitation facility (TCU/ home with 24/7 SPV)     Equipment recommendations: Equipment Needed After Discharge: none     GOALS:   Multidisciplinary Problems      Physical Therapy Goals        Problem: Physical Therapy    Goal Priority Disciplines Outcome Goal Variances Interventions   Physical Therapy Goal     PT, PT/OT Ongoing, Progressing     Description: Goals to be met by: 22     Patient will increase functional independence with mobility by performin. Supine to sit with [place]  2. Sit to supine with [place]  3. Sit to stand transfer with [place]  4. Gait  x 200 feet with Modified [place] using Quad Cane vs none/LRAD.                      PLAN:    Patient to be seen daily  to address the above listed problems via gait training, therapeutic exercises, therapeutic activities  Plan of Care expires: 22  Plan of Care reviewed with: patient         2022

## 2022-07-05 NOTE — PLAN OF CARE
Spoke with Rob with [facility] who gave me Gertrude Christian  number  fx .   Gertrude tells me the  has been changed from her to Alejo Yoder phone number  fax is the same . Called and left voice message.   It is recommended that pt have acute rehab. He has some dysphagia, non wt bearing LUE with WB elbow/forearm, splinted LUE to MCP per OT notes  Spoke with pt with  line [phone] jonna. He works for [employer] and wants me to call his boss the owner [employer] . I called and spoke with Obradha and his  Veronica. I explained that I had left voice message for Alejo Samsyor his  and am waiting for a call back. We would like to get pt to inpt rehab which pt confirms he understands and is in agreement. He knows it will depend on the /workmans comp if they will agree and where. Bre[facility] and Veronica would like [facility] because it is close and they can check in on him. Alexander has worked for [employer] for 7 years.   Called Gertrude back and left voice message asking her if she can help facilitate a timely conversation between Alejo Yoder and me , it would be appreciated.

## 2022-07-06 PROCEDURE — 97116 GAIT TRAINING THERAPY: CPT

## 2022-07-06 PROCEDURE — 92526 ORAL FUNCTION THERAPY: CPT

## 2022-07-06 PROCEDURE — 25000003 PHARM REV CODE 250: Performed by: NEUROLOGICAL SURGERY

## 2022-07-06 PROCEDURE — 11000001 HC ACUTE MED/SURG PRIVATE ROOM

## 2022-07-06 PROCEDURE — 63600175 PHARM REV CODE 636 W HCPCS

## 2022-07-06 PROCEDURE — 97530 THERAPEUTIC ACTIVITIES: CPT

## 2022-07-06 PROCEDURE — 97535 SELF CARE MNGMENT TRAINING: CPT

## 2022-07-06 PROCEDURE — 25000003 PHARM REV CODE 250

## 2022-07-06 RX ADMIN — HYDROCODONE BITARTRATE AND ACETAMINOPHEN 1 TABLET: 10; 325 TABLET ORAL at 09:07

## 2022-07-06 RX ADMIN — QUETIAPINE FUMARATE 50 MG: 25 TABLET ORAL at 09:07

## 2022-07-06 RX ADMIN — LEVETIRACETAM 500 MG: 500 TABLET, FILM COATED ORAL at 09:07

## 2022-07-06 RX ADMIN — DOCUSATE SODIUM 50 MG: 50 CAPSULE, LIQUID FILLED ORAL at 09:07

## 2022-07-06 RX ADMIN — POLYETHYLENE GLYCOL 3350 17 G: 17 POWDER, FOR SOLUTION ORAL at 09:07

## 2022-07-06 RX ADMIN — ENOXAPARIN SODIUM 120 MG: 150 INJECTION SUBCUTANEOUS at 09:07

## 2022-07-06 NOTE — PT/OT/SLP PROGRESS
Physical Therapy         Treatment        Alexander Ortiz   MRN: [id number]     PT Received On: 07/06/22  PT Start Time: 1350     PT Stop Time: 1428    PT Total Time (min): 38 min       Billable Minutes:  Gait [id number] and Therapeutic Activity 23  Total Minutes: 38    Treatment Type: Treatment  PT/PTA: PT     PTA Visit Number: 3       General Precautions: Standard, fall  Orthopedic Precautions: Orthopedic Precautions : LUE non weight bearing   Braces: Braces: N/A    Spiritual, Cultural Beliefs, Anglican Practices, Values that Affect Care: no    Subjective:  Communicated with NSG prior to session.    Pain/Comfort  Pain Rating 1: 0/10    Objective:  Patient found lying in bed    Vitals   At rest   BP= 141/98    Functional Mobility:  Bed Mobility:   Supine to sit: Supervision or Set-up Assistance   Sit to supine: Supervision or Set-up Assistance    Transfer Training:   Sit to stand:Stand-by Assistance with No Assistive Device     Gait Training:    Pt ambulated 300 feet x2 trials with SBA-CGA for safety & no AD used; step-through pattern; slightly unsteady however no overt LOB      Additional Treatment:  · Ball kicks  · Pt alt LEs to kick a ball rolled towards him; slightly unsteady but CGA assist  · Cone weaving   · Pt ambulated approx 40 feet by ambulating through cones placed on the floor; able to perform sharp directional changes without any LOB   · Cone Taps  · 3 cones of different colors placed on floor in front of pt; PT promoted pt by stating a color and pt would use 1 LE at a time to tap cone; alt LEs  · Alex required 2/2 occasional LOB and unable to fully self correct; pt able to correctly choose cone to tap based upon command given    Activity Tolerance:  Patient tolerated treatment well    Patient left supine with all lines intact, call button in reach and bed alarm on.    Assessment:    Rehab potential is excellent.    Activity tolerance: Excellent    Discharge recommendations: Discharge Facility/Level of  Care Needs: rehabilitation facility (vs home with  SPV)     Equipment recommendations: Equipment Needed After Discharge: none     GOALS:   Multidisciplinary Problems     Physical Therapy Goals        Problem: Physical Therapy    Goal Priority Disciplines Outcome Goal Variances Interventions   Physical Therapy Goal     PT, PT/OT Ongoing, Progressing     Description: Goals to be met by: 22     Patient will increase functional independence with mobility by performin. Supine to sit with [place]  2. Sit to supine with [place]  3. Sit to stand transfer with [place]  4. Gait  x 200 feet with Modified [place] using Quad Cane vs none/LRAD.                      PLAN:    Patient to be seen daily  to address the above listed problems via gait training, therapeutic activities, therapeutic exercises  Plan of Care expires: 22  Plan of Care reviewed with: patient         2022

## 2022-07-06 NOTE — PT/OT/SLP PROGRESS
Dys[facility]agia Treatment Note  [facility]    PATIENT IDENTIFICATION:  Name: Alexander Ortiz     Sex: male   : 1975  Admission Date: 2022   Age: 46 y.o. Admitting Provider: Maury Quinones Jr., MD   MRN: [id number]   Attending Provider: Maury Quinones Jr., MD      INPATIENT PROBLEM LIST:    Active Hospital Problems    Diagnosis  POA    *Trauma [T14.90XA]  Unknown      Resolved Hospital Problems   No resolved problems to display.          Current Status:   Respiratory: Room Air   Affect: Calm and Cooperative   Precautions: None    Pain: 0/10    Treatment:  PO Trials:  Consistency Method of Presentation  Outcome   Ice chips and water Via spoon and via cup Adequate bolus transfer with prompt swallow initiation noted upon palpation. No overt signs or symptoms of aspiration observed. No change in vocal or respiratory quality observed.        Patient has been tolerating a thin liquids diet. SLP to sign off. Please re-consult if needed.    Edyta Willingham CCC-SLP
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
## 2022-07-06 NOTE — PLAN OF CARE
Myles called from [place] that [facility] Will resend. This done.
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
## 2022-07-07 LAB
ANION GAP SERPL CALC-SCNC: 9 MEQ/L
BASOPHILS # BLD AUTO: 0.12 X10(3)/MCL (ref 0–0.2)
BASOPHILS NFR BLD AUTO: 0.9 %
BUN SERPL-MCNC: 7.3 MG/DL (ref 8.9–20.6)
CALCIUM SERPL-MCNC: 9.3 MG/DL (ref 8.4–10.2)
CHLORIDE SERPL-SCNC: 106 MMOL/L (ref 98–107)
CO2 SERPL-SCNC: 28 MMOL/L (ref 22–29)
CREAT SERPL-MCNC: 1.04 MG/DL (ref 0.73–1.18)
CREAT/UREA NIT SERPL: 7
EOSINOPHIL # BLD AUTO: 0.39 X10(3)/MCL (ref 0–0.9)
EOSINOPHIL NFR BLD AUTO: 2.8 %
ERYTHROCYTE [DISTWIDTH] IN BLOOD BY AUTOMATED COUNT: 14.6 % (ref 11.5–17)
GLUCOSE SERPL-MCNC: 108 MG/DL (ref 74–100)
HCT VFR BLD AUTO: 31.3 % (ref 42–52)
HGB BLD-MCNC: 10.4 GM/DL (ref 14–18)
IMM GRANULOCYTES # BLD AUTO: 0.17 X10(3)/MCL (ref 0–0.04)
IMM GRANULOCYTES NFR BLD AUTO: 1.2 %
LYMPHOCYTES # BLD AUTO: 1.44 X10(3)/MCL (ref 0.6–4.6)
LYMPHOCYTES NFR BLD AUTO: 10.2 %
MCH RBC QN AUTO: 29 PG (ref 27–31)
MCHC RBC AUTO-ENTMCNC: 33.2 MG/DL (ref 33–36)
MCV RBC AUTO: 87.2 FL (ref 80–94)
MONOCYTES # BLD AUTO: 0.88 X10(3)/MCL (ref 0.1–1.3)
MONOCYTES NFR BLD AUTO: 6.2 %
NEUTROPHILS # BLD AUTO: 11.1 X10(3)/MCL (ref 2.1–9.2)
NEUTROPHILS NFR BLD AUTO: 78.7 %
NRBC BLD AUTO-RTO: 0 %
PLATELET # BLD AUTO: 227 X10(3)/MCL (ref 130–400)
PMV BLD AUTO: 10.2 FL (ref 7.4–10.4)
POTASSIUM SERPL-SCNC: 4.3 MMOL/L (ref 3.5–5.1)
RBC # BLD AUTO: 3.59 X10(6)/MCL (ref 4.7–6.1)
SODIUM SERPL-SCNC: 143 MMOL/L (ref 136–145)
WBC # SPEC AUTO: 14.1 X10(3)/MCL (ref 4.5–11.5)

## 2022-07-07 PROCEDURE — 80048 BASIC METABOLIC PNL TOTAL CA: CPT | Performed by: STUDENT IN AN ORGANIZED HEALTH CARE EDUCATION/TRAINING PROGRAM

## 2022-07-07 PROCEDURE — 85025 COMPLETE CBC W/AUTO DIFF WBC: CPT | Performed by: STUDENT IN AN ORGANIZED HEALTH CARE EDUCATION/TRAINING PROGRAM

## 2022-07-07 PROCEDURE — 97530 THERAPEUTIC ACTIVITIES: CPT

## 2022-07-07 PROCEDURE — 11000001 HC ACUTE MED/SURG PRIVATE ROOM

## 2022-07-07 PROCEDURE — 25000003 PHARM REV CODE 250

## 2022-07-07 PROCEDURE — 25000003 PHARM REV CODE 250: Performed by: NEUROLOGICAL SURGERY

## 2022-07-07 PROCEDURE — 36415 COLL VENOUS BLD VENIPUNCTURE: CPT | Performed by: STUDENT IN AN ORGANIZED HEALTH CARE EDUCATION/TRAINING PROGRAM

## 2022-07-07 RX ADMIN — HYDROCODONE BITARTRATE AND ACETAMINOPHEN 1 TABLET: 10; 325 TABLET ORAL at 08:07

## 2022-07-07 RX ADMIN — DOCUSATE SODIUM 50 MG: 50 CAPSULE, LIQUID FILLED ORAL at 08:07

## 2022-07-07 RX ADMIN — POLYETHYLENE GLYCOL 3350 17 G: 17 POWDER, FOR SOLUTION ORAL at 08:07

## 2022-07-07 RX ADMIN — QUETIAPINE FUMARATE 50 MG: 25 TABLET ORAL at 08:07
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
## 2022-07-07 NOTE — PLAN OF CARE
"  Problem: Adult Inpatient Plan of Care  Goal: Plan of Care Review  Outcome: Ongoing, Progressing  Goal: Patient-Specific Goal (Individualized)  Outcome: Ongoing, Progressing  Flowsheets (Taken 7/7/2022 0247)  Patient-Specific Goals (Include Timeframe): "i want to go home"  Goal: Absence of Hospital-Acquired Illness or Injury  Outcome: Ongoing, Progressing  Goal: Optimal Comfort and Wellbeing  Outcome: Ongoing, Progressing  Goal: Readiness for Transition of Care  Outcome: Ongoing, Progressing     Problem: Infection  Goal: Absence of Infection Signs and Symptoms  Outcome: Ongoing, Progressing     Problem: Impaired Wound Healing  Goal: Optimal Wound Healing  Outcome: Ongoing, Progressing     "

## 2022-07-07 NOTE — PT/OT/SLP PROGRESS
Occupational Therapy   Treatment    Name: Alexander Ortiz  MRN: [id number]  Admitting Diagnosis:  Trauma, s/p bifrontalcraniotomy, s/p frontal sinus exoneration, repair of dura, repair of fxs (6/23), B pulmonary embolism; IVC filter; L hand fx s/p closed reduction percutaneous pinning L 4/5 CMC (7/1)       Recommendations:     Discharge Recommendations: home with assist vs rehabilitation facility  Barriers to discharge:  Decreased caregiver support    Assessment:     Alexander Ortiz is a 46 y.o. male with a medical diagnosis of Trauma, s/p bifrontalcraniotomy, s/p frontal sinus exoneration, repair of dura, repair of fxs (6/23), B pulmonary embolism; IVC filter; L hand fx s/p closed reduction percutaneous pinning L 4/5 CMC (7/1). Performance deficits affecting function are impaired self care skills, impaired functional mobilty, decreased upper extremity function, impaired balance, pain, decreased safety awareness, orthopedic precautions.     Rehab Prognosis:  Good; patient would benefit from acute skilled OT services to address these deficits and reach maximum level of function.       Plan:     Patient to be seen 5 x/week, daily to address the above listed problems via self-care/home management, therapeutic activities  · Plan of Care Expires: 07/21/22  · Plan of Care Reviewed with: patient    Subjective     Pain/Comfort:  · Pain Rating 1: 8/10  · Location 1: head  · Pain Addressed 1: Reposition, Distraction    Objective:     Communicated with: RN prior to session.  Patient found left sidelying with telemetry, pulse ox (continuous) upon OT entry to room.    General Precautions: Standard, other (see comments) (SBP<140)   Orthopedic Precautions:LUE non weight bearing   Respiratory Status: Room air     Occupational Performance:     Bed Mobility:    · Patient completed Supine to Sit with supervision  · Patient completed Sit to Supine with supervision     Functional Mobility/Transfers:  · Patient completed Sit <> Stand  Transfer with stand by assistance  with  gait belt   · Functional Mobility: Pt able to perform sit to stand with SBA for safety. Pt reported no dizziness or pain when performing. Pt requires min cues for safety awareness throughout bed mobility and transfers. Pt educated on NWB pxns.     Activities of Daily Living:  · Lower Body Dressing: minimum assistance pt required min assitance to fix socks d/t socks being too tight, but was able to do figure 4 to attempt.       Treatment & Education:  OT educated pt on POC, pt verbalized agreement. Upon arrival, pt seemed nervous/scared, stated that he was anxious about the surgery because it had not happened and wanted to know more on when it would be schedule. Pt stated that he was hungry and thirsty, OT educated pt on NPO status prior to IVC filter removal. Pt was educated on and performed therapeutic activity that focused on assessing cognitive status, increasing safety awareness, and working on dynamic standing balance. Pt stated that he feels better, but still cannot walk as fast and his balance is not as good as it was before accident. Pt was able to recall pattern of cones that was given, retrieve the cones in order and then place them back in the same spot. While ambulating pt able to name three things that are cold and three things that are hot in Portuguese. Pt reported feeling pain on his sx site; 8/10. Pts BP taken prior to session; 126/79.     Patient left HOB elevated with all lines intact and call button in reachEducation:      GOALS:   Multidisciplinary Problems     Occupational Therapy Goals        Problem: Occupational Therapy    Goal Priority Disciplines Outcome Interventions   Occupational Therapy Goal     OT, PT/OT Ongoing, Progressing    Description: Goals to be met by: 7/14/22    Patient will increase functional independence with ADLs by performing:    UE dressing with mod I (excluding fasteners).  LE Dressing with Modified [place].  Grooming while  standing at sink with Supervision.  Toileting from toilet with Supervision for hygiene and clothing management.   Pt will increased L UE shld flexion strength to 4+/5.                     Time Tracking:     OT Date of Treatment: 07/07/22  OT Start Time: 1336  OT Stop Time: 1400  OT Total Time (min): 24 min    Billable Minutes:Therapeutic Activity     OT/ALEXIS: OT     ALEXIS Visit Number: 2    7/7/2022  Note edited and signed by OT Krysten Bloom, direct supervision throughout.

## 2022-07-08 PROCEDURE — 25000003 PHARM REV CODE 250

## 2022-07-08 PROCEDURE — 63600175 PHARM REV CODE 636 W HCPCS: Performed by: RADIOLOGY

## 2022-07-08 PROCEDURE — 25000003 PHARM REV CODE 250: Performed by: NEUROLOGICAL SURGERY

## 2022-07-08 PROCEDURE — 25000003 PHARM REV CODE 250: Performed by: NURSE PRACTITIONER

## 2022-07-08 PROCEDURE — 25000003 PHARM REV CODE 250: Performed by: RADIOLOGY

## 2022-07-08 PROCEDURE — 97116 GAIT TRAINING THERAPY: CPT | Mod: CQ

## 2022-07-08 PROCEDURE — 63600175 PHARM REV CODE 636 W HCPCS

## 2022-07-08 PROCEDURE — 11000001 HC ACUTE MED/SURG PRIVATE ROOM

## 2022-07-08 PROCEDURE — 97530 THERAPEUTIC ACTIVITIES: CPT | Mod: CQ

## 2022-07-08 RX ORDER — MIDAZOLAM HYDROCHLORIDE 1 MG/ML
INJECTION INTRAMUSCULAR; INTRAVENOUS CODE/TRAUMA/SEDATION MEDICATION
Status: COMPLETED | OUTPATIENT
Start: 2022-07-08 | End: 2022-07-08

## 2022-07-08 RX ORDER — SODIUM CHLORIDE 9 MG/ML
INJECTION, SOLUTION INTRAVENOUS CONTINUOUS
Status: DISCONTINUED | OUTPATIENT
Start: 2022-07-08 | End: 2022-07-09

## 2022-07-08 RX ORDER — FENTANYL CITRATE 50 UG/ML
INJECTION, SOLUTION INTRAMUSCULAR; INTRAVENOUS CODE/TRAUMA/SEDATION MEDICATION
Status: COMPLETED | OUTPATIENT
Start: 2022-07-08 | End: 2022-07-08

## 2022-07-08 RX ORDER — LIDOCAINE HYDROCHLORIDE 20 MG/ML
INJECTION, SOLUTION INFILTRATION; PERINEURAL CODE/TRAUMA/SEDATION MEDICATION
Status: COMPLETED | OUTPATIENT
Start: 2022-07-08 | End: 2022-07-08

## 2022-07-08 RX ADMIN — DOCUSATE SODIUM 50 MG: 50 CAPSULE, LIQUID FILLED ORAL at 08:07

## 2022-07-08 RX ADMIN — SODIUM CHLORIDE: 9 INJECTION, SOLUTION INTRAVENOUS at 11:07

## 2022-07-08 RX ADMIN — LIDOCAINE HYDROCHLORIDE 5 ML: 20 INJECTION, SOLUTION INFILTRATION; PERINEURAL at 02:07

## 2022-07-08 RX ADMIN — MIDAZOLAM HYDROCHLORIDE 1 MG: 1 INJECTION, SOLUTION INTRAMUSCULAR; INTRAVENOUS at 02:07

## 2022-07-08 RX ADMIN — SODIUM CHLORIDE 1000 ML: 9 INJECTION, SOLUTION INTRAVENOUS at 07:07

## 2022-07-08 RX ADMIN — POLYETHYLENE GLYCOL 3350 17 G: 17 POWDER, FOR SOLUTION ORAL at 08:07

## 2022-07-08 RX ADMIN — HYDROCODONE BITARTRATE AND ACETAMINOPHEN 1 TABLET: 5; 325 TABLET ORAL at 08:07

## 2022-07-08 RX ADMIN — FENTANYL CITRATE 50 MCG: 50 INJECTION, SOLUTION INTRAMUSCULAR; INTRAVENOUS at 02:07

## 2022-07-08 RX ADMIN — ENOXAPARIN SODIUM 120 MG: 150 INJECTION SUBCUTANEOUS at 08:07

## 2022-07-08 RX ADMIN — QUETIAPINE FUMARATE 50 MG: 25 TABLET ORAL at 08:07
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
## 2022-07-08 NOTE — OP NOTE
Radiology Post-Procedure Note    Pre Op Diagnosis: IVC Filter    Post Op Diagnosis: No IVC Filter    Procedure:  Filter Removal    Procedure performed by: Frank Childress M.D.    Written Informed Consent Obtained: Yes    Specimen Removed: NO    Estimated Blood Loss: Minimal    Findings:   Standard Filter Removal    Additional specimen sent to lab: No    Patient tolerated procedure well.    Frank Childress MD

## 2022-07-08 NOTE — PLAN OF CARE
Myles with [facility] called that they are in process with workmans comp in negotiating a price and hope to be able to take pt on Monday 7/11   She requested additional info be faxed to her. Fax [phone]. This done.

## 2022-07-08 NOTE — PT/OT/SLP PROGRESS
Physical Therapy         Treatment        Alexander Ortiz   MRN: [id number]        PT Start Time: 0901     PT Stop Time: 0927    PT Total Time (min): 26 min       Billable Minutes:  Gait Pennaesl86 and Therapeutic Activity 11  Total Minutes: 26    Treatment Type: Treatment  PT/PTA: PTA     PTA Visit Number: 4       General Precautions: Standard, fall  Orthopedic Precautions: Orthopedic Precautions : LUE non weight bearing (NWB L Hand)   Braces:      Spiritual, Cultural Beliefs, Zoroastrian Practices, Values that Affect Care: no    Subjective:  Communicated with nurse prior to session.  Pt voiced the d/c plan is rehab  Pain/Comfort  Pain Rating 1: 0/10    Objective:  Patient found laying in bed upon arrival, with Patient found with: peripheral IV  Cognitive test performed, pt is oriented x4  Functional Mobility:  Bed Mobility:   Supine to sit: Standby Assistance- with HOB slightly elevated   Sit to supine: Standby Assistance   Rolling: Activity did not occur   Scooting: Activity did not occur    Balance:   Static Sit: FAIR+: Able to take MINIMAL challenges from all directions  Dynamic Sit:  0: N/A  Static Stand: GOOD+: Takes MAXIMAL challenges from all directions- pt was able to perform void of urine in front of toilet with no issues  Dynamic stand: 0: N/A    Transfer Training:  Sit to stand:Stand-by Assistance with No Assistive Device sit to stand from EOB      Gait Training:  Patient gait trained FWB/WBAT: bilateral lower extremity 500  feet on level tile with No Assistive Device with Stand-by Assistance.  Pt with demonstarting a  reciprocal gait with decreased magdy.Impairments contributing to gait deviations include pt was able to look up/down, L<>R during gait with no LOB noted, pt was able to maintain balance during propulsions performed on him outside of [place], pt was able to perform side steps today L<>R and forward<>back with no issues, pt performed visual mapping and was able to find room without issues,        Additional Treatment:  Pt performed standing therex with no LOB BLE 10x  Heel raises, marches, Knee curls. Hip abd/add    Activity Tolerance:  Patient tolerated treatment well    Patient left HOB elevated with all lines intact and call button in reach.    Assessment:  Alexander Ortiz is a 46 y.o. male with a medical diagnosis of Trauma. If pt does d/c home he states he does have steps to get into home    Rehab potential is fair.    Activity tolerance: Excellent    Discharge recommendations: Discharge Facility/Level of Care Needs: home with home health, rehabilitation facility     Equipment recommendations: Equipment Needed After Discharge: none     GOALS:   Multidisciplinary Problems     Physical Therapy Goals        Problem: Physical Therapy    Goal Priority Disciplines Outcome Goal Variances Interventions   Physical Therapy Goal     PT, PT/OT Ongoing, Progressing     Description: Goals to be met by: 22     Patient will increase functional independence with mobility by performin. Supine to sit with [place]  2. Sit to supine with [place]  3. Sit to stand transfer with [place]  4. Gait  x 200 feet with Modified [place] using Quad Cane vs none/LRAD.                      PLAN:    Patient to be seen daily  to address the above listed problems via gait training, therapeutic activities, therapeutic exercises  Plan of Care expires: 22  Plan of Care reviewed with: patient         2022

## 2022-07-08 NOTE — PT/OT/SLP PROGRESS
Occupational Therapy      Patient Name:  Alexander Ortiz   MRN:  [id number]    Patient not seen today secondary to Off the floor for procedure/surgery. Will follow-up 7/8.  IVC filter removal.    7/8/2022

## 2022-07-09 PROCEDURE — 97116 GAIT TRAINING THERAPY: CPT | Mod: CQ

## 2022-07-09 PROCEDURE — 97535 SELF CARE MNGMENT TRAINING: CPT | Mod: CO

## 2022-07-09 PROCEDURE — 25000003 PHARM REV CODE 250

## 2022-07-09 PROCEDURE — 11000001 HC ACUTE MED/SURG PRIVATE ROOM

## 2022-07-09 PROCEDURE — 63600175 PHARM REV CODE 636 W HCPCS

## 2022-07-09 PROCEDURE — 25000003 PHARM REV CODE 250: Performed by: NEUROLOGICAL SURGERY

## 2022-07-09 RX ADMIN — POLYETHYLENE GLYCOL 3350 17 G: 17 POWDER, FOR SOLUTION ORAL at 09:07

## 2022-07-09 RX ADMIN — HYDROCODONE BITARTRATE AND ACETAMINOPHEN 1 TABLET: 10; 325 TABLET ORAL at 08:07

## 2022-07-09 RX ADMIN — DOCUSATE SODIUM 50 MG: 50 CAPSULE, LIQUID FILLED ORAL at 09:07

## 2022-07-09 RX ADMIN — POLYETHYLENE GLYCOL 3350 17 G: 17 POWDER, FOR SOLUTION ORAL at 08:07

## 2022-07-09 RX ADMIN — ENOXAPARIN SODIUM 120 MG: 150 INJECTION SUBCUTANEOUS at 08:07

## 2022-07-09 RX ADMIN — QUETIAPINE FUMARATE 50 MG: 25 TABLET ORAL at 09:07

## 2022-07-09 RX ADMIN — DOCUSATE SODIUM 50 MG: 50 CAPSULE, LIQUID FILLED ORAL at 08:07

## 2022-07-09 RX ADMIN — ENOXAPARIN SODIUM 120 MG: 150 INJECTION SUBCUTANEOUS at 09:07

## 2022-07-09 NOTE — PT/OT/SLP PROGRESS
Occupational Therapy  Treatment    Alexander Ortiz   MRN: [id number]   Admitting Diagnosis: Trauma    OT Date of Treatment: 07/09/22   OT Start Time: 0931  OT Stop Time: 0955  OT Total Time (min): 24 min     Billable Minutes:  Self Care/Home Management .  Total Minutes: 24           ALEXIS Visit Number: 4    General Precautions: Standard, other (see comments) (SBP<140)  Orthopedic Precautions:  (NWB ON LUE)  Braces:      Spiritual, Cultural Beliefs, Sabianist Practices, Values that Affect Care: no    Subjective:  Communicated with RN prior to session.         Objective:  Patient found with:  (Supine with HOB elevated)   Pt able to maintain NWB on LUE throughout tx.    Functional Mobility:  Bed Mobility:   Supine to sit: Contact Guard Assistance   Sit to supine: Supervision or Set-up Assistance   Rolling: Activity did not occur   Scooting: Supervision or Set-up Assistance    Transfer Training:   Sit to stand:Supervision or Set-up Assistance with No Assistive Device .  Toilet Transfer:  Pt Step Transfer with Supervision or Set-up Assistance with No Assistive Device .      Toilet Training:  Pt performed toileting with Supervision or Set-up Assistance with Grab  bar at [employer].   LBD performed including donning/doffing B socks with Mod A for donning socks d/t increased difficulty with one handed dressing techniques.      Patient left supine with call button in reach    ASSESSMENT:  Alexander Ortiz is a 46 y.o. male with a medical diagnosis of Trauma and presents decreased UE coordination and orthopedic pxns impeding independence with ADL tasks.     Rehab potential is good    Activity tolerance: Good    Discharge recommendations:       Equipment recommendations:       GOALS:   Multidisciplinary Problems     Occupational Therapy Goals        Problem: Occupational Therapy    Goal Priority Disciplines Outcome Interventions   Occupational Therapy Goal     OT, PT/OT Ongoing, Progressing    Description: Goals to be met by:  7/14/22    Patient will increase functional independence with ADLs by performing:    UE dressing with mod I (excluding fasteners).  LE Dressing with Modified [place].  Grooming while standing at sink with Supervision.  Toileting from toilet with Supervision for hygiene and clothing management.   Pt will increased L UE shld flexion strength to 4+/5.                     Plan:  Patient to be seen 5 x/week, daily to address the above listed problems via self-care/home management, therapeutic activities  Plan of Care expires: 07/21/22  Plan of Care reviewed with: patient         07/09/2022
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
## 2022-07-09 NOTE — PT/OT/SLP PROGRESS
Physical Therapy Treatment    Patient Name:  Alexander Ortiz   MRN:  [id number]    Recommendations:     Discharge Recommendations:  home with home health, rehabilitation facility   Discharge Equipment Recommendations: none     Subjective     Patient cooperative.     Objective:     General Precautions: Standard, fall   Orthopedic Precautions:LUE non weight bearing (NWB L Hand)   Braces:    Respiratory Status: Room air     Communicated with nurse prior to treatment.     Functional Mobility:    Rolling:Stand-by Assistance  Supine to sit:Stand-by Assistance  Sit to stand transfer: Minimal Assistance  Bed to chair transfer:Minimal Assistance  300 ft without AD SBA.       AM-PAC 6 CLICK MOBILITY        Patient left supine with bed alarm on..    GOALS:   Multidisciplinary Problems     Physical Therapy Goals        Problem: Physical Therapy    Goal Priority Disciplines Outcome Goal Variances Interventions   Physical Therapy Goal     PT, PT/OT Ongoing, Progressing     Description: Goals to be met by: 22     Patient will increase functional independence with mobility by performin. Supine to sit with [place]  2. Sit to supine with [place]  3. Sit to stand transfer with [place]  4. Gait  x 200 feet with Modified [place] using Quad Cane vs none/LRAD.                      Assessment:     Alexander Ortiz is a 46 y.o. male admitted with a medical diagnosis of Trauma.     Rehab Prognosis: Good; patient would benefit from acute skilled PT services to address these deficits and reach maximum level of function.    Recent Surgery: Procedure(s) (LRB):  CLOSED REDUCTION, FRACTURE, HAND, WITH PERCUTANEOUS PINNING (Left) 8 Days Post-Op    Plan:     During this hospitalization, patient to be seen daily to address the identified rehab impairments via gait training, therapeutic activities, therapeutic exercises and progress toward the following goals:    · Plan of Care Expires:  22    Billable Minutes      Billable Minutes: Gait Training 15    Treatment Type: Treatment  PT/PTA: PTA     PTA Visit Number: 5 07/09/2022

## 2022-07-10 PROCEDURE — 25000003 PHARM REV CODE 250

## 2022-07-10 PROCEDURE — 63600175 PHARM REV CODE 636 W HCPCS

## 2022-07-10 PROCEDURE — 25000003 PHARM REV CODE 250: Performed by: NEUROLOGICAL SURGERY

## 2022-07-10 PROCEDURE — 11000001 HC ACUTE MED/SURG PRIVATE ROOM

## 2022-07-10 RX ORDER — BISACODYL 10 MG
10 SUPPOSITORY, RECTAL RECTAL ONCE
Status: DISCONTINUED | OUTPATIENT
Start: 2022-07-10 | End: 2022-07-11 | Stop reason: HOSPADM

## 2022-07-10 RX ADMIN — DOCUSATE SODIUM 50 MG: 50 CAPSULE, LIQUID FILLED ORAL at 09:07

## 2022-07-10 RX ADMIN — ENOXAPARIN SODIUM 120 MG: 150 INJECTION SUBCUTANEOUS at 09:07

## 2022-07-10 RX ADMIN — POLYETHYLENE GLYCOL 3350 17 G: 17 POWDER, FOR SOLUTION ORAL at 09:07

## 2022-07-10 RX ADMIN — HYDROCODONE BITARTRATE AND ACETAMINOPHEN 1 TABLET: 10; 325 TABLET ORAL at 09:07

## 2022-07-10 RX ADMIN — QUETIAPINE FUMARATE 50 MG: 25 TABLET ORAL at 09:07
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
## 2022-07-11 VITALS
DIASTOLIC BLOOD PRESSURE: 85 MMHG | OXYGEN SATURATION: 97 % | TEMPERATURE: 98 F | SYSTOLIC BLOOD PRESSURE: 134 MMHG | WEIGHT: 264.56 LBS | BODY MASS INDEX: 39.18 KG/M2 | RESPIRATION RATE: 18 BRPM | HEART RATE: 83 BPM | HEIGHT: 69 IN

## 2022-07-11 LAB
ANION GAP SERPL CALC-SCNC: 11 MEQ/L
BASOPHILS # BLD AUTO: 0.05 X10(3)/MCL (ref 0–0.2)
BASOPHILS NFR BLD AUTO: 0.5 %
BUN SERPL-MCNC: 5.7 MG/DL (ref 8.9–20.6)
CALCIUM SERPL-MCNC: 9.6 MG/DL (ref 8.4–10.2)
CHLORIDE SERPL-SCNC: 102 MMOL/L (ref 98–107)
CO2 SERPL-SCNC: 26 MMOL/L (ref 22–29)
CREAT SERPL-MCNC: 0.76 MG/DL (ref 0.73–1.18)
CREAT/UREA NIT SERPL: 8
EOSINOPHIL # BLD AUTO: 0.36 X10(3)/MCL (ref 0–0.9)
EOSINOPHIL NFR BLD AUTO: 3.5 %
ERYTHROCYTE [DISTWIDTH] IN BLOOD BY AUTOMATED COUNT: 13.7 % (ref 11.5–17)
GLUCOSE SERPL-MCNC: 97 MG/DL (ref 74–100)
HCT VFR BLD AUTO: 34.5 % (ref 42–52)
HGB BLD-MCNC: 11.1 GM/DL (ref 14–18)
IMM GRANULOCYTES # BLD AUTO: 0.09 X10(3)/MCL (ref 0–0.04)
IMM GRANULOCYTES NFR BLD AUTO: 0.9 %
LYMPHOCYTES # BLD AUTO: 2.19 X10(3)/MCL (ref 0.6–4.6)
LYMPHOCYTES NFR BLD AUTO: 21 %
MCH RBC QN AUTO: 28.1 PG (ref 27–31)
MCHC RBC AUTO-ENTMCNC: 32.2 MG/DL (ref 33–36)
MCV RBC AUTO: 87.3 FL (ref 80–94)
MONOCYTES # BLD AUTO: 0.64 X10(3)/MCL (ref 0.1–1.3)
MONOCYTES NFR BLD AUTO: 6.1 %
NEUTROPHILS # BLD AUTO: 7.1 X10(3)/MCL (ref 2.1–9.2)
NEUTROPHILS NFR BLD AUTO: 68 %
NRBC BLD AUTO-RTO: 0 %
PLATELET # BLD AUTO: 245 X10(3)/MCL (ref 130–400)
PMV BLD AUTO: 10.4 FL (ref 7.4–10.4)
POTASSIUM SERPL-SCNC: 3.7 MMOL/L (ref 3.5–5.1)
RBC # BLD AUTO: 3.95 X10(6)/MCL (ref 4.7–6.1)
SODIUM SERPL-SCNC: 139 MMOL/L (ref 136–145)
WBC # SPEC AUTO: 10.4 X10(3)/MCL (ref 4.5–11.5)

## 2022-07-11 PROCEDURE — 97164 PT RE-EVAL EST PLAN CARE: CPT

## 2022-07-11 PROCEDURE — 85025 COMPLETE CBC W/AUTO DIFF WBC: CPT | Performed by: STUDENT IN AN ORGANIZED HEALTH CARE EDUCATION/TRAINING PROGRAM

## 2022-07-11 PROCEDURE — 97168 OT RE-EVAL EST PLAN CARE: CPT

## 2022-07-11 PROCEDURE — 63600175 PHARM REV CODE 636 W HCPCS

## 2022-07-11 PROCEDURE — 25000003 PHARM REV CODE 250

## 2022-07-11 PROCEDURE — 36415 COLL VENOUS BLD VENIPUNCTURE: CPT | Performed by: STUDENT IN AN ORGANIZED HEALTH CARE EDUCATION/TRAINING PROGRAM

## 2022-07-11 PROCEDURE — 80048 BASIC METABOLIC PNL TOTAL CA: CPT | Performed by: STUDENT IN AN ORGANIZED HEALTH CARE EDUCATION/TRAINING PROGRAM

## 2022-07-11 RX ORDER — OXYCODONE HYDROCHLORIDE 5 MG/1
5 TABLET ORAL EVERY 4 HOURS PRN
Qty: 18 TABLET | Refills: 0 | Status: SHIPPED | OUTPATIENT
Start: 2022-07-11

## 2022-07-11 RX ADMIN — DOCUSATE SODIUM 50 MG: 50 CAPSULE, LIQUID FILLED ORAL at 10:07

## 2022-07-11 RX ADMIN — ENOXAPARIN SODIUM 120 MG: 150 INJECTION SUBCUTANEOUS at 10:07

## 2022-07-11 RX ADMIN — POLYETHYLENE GLYCOL 3350 17 G: 17 POWDER, FOR SOLUTION ORAL at 10:07

## 2022-07-11 NOTE — PT/OT/SLP RE-EVAL
Occupational Therapy   Re-evaluation    Name: Alexander Ortiz  MRN: [id number]  Admitting Diagnosis:  Trauma, s/p bifrontalcraniotomy, s/p frontal sinus exoneration, repair of dura, repair of fxs (6/23), B pulmonary embolism; IVC filter; L hand fx s/p closed reduction percutaneous pinning L 4/5 CMC   Recent Surgery: Procedure(s) (LRB):  CLOSED REDUCTION, FRACTURE, HAND, WITH PERCUTANEOUS PINNING (Left) 10 Days Post-Op    Recommendations:     Discharge Recommendations: Home with family/coworker assistance      Assessment:     Alexander Ortiz is a 46 y.o. male with a medical diagnosis of Trauma, s/p bifrontalcraniotomy, s/p frontal sinus exoneration, repair of dura, repair of fxs (6/23), B pulmonary embolism; IVC filter removal; L hand fx s/p closed reduction percutaneous pinning L 4/5 CMC.  Performance deficits affecting function are decreased upper extremity function.      Rehab Prognosis:  Good; patient would benefit from acute skilled OT services to address these deficits and reach maximum level of function.       Plan:     Patient to be seen 5 x/week to address the above listed problems via self-care/home management  · Plan of Care Expires: 07/25/22  · Plan of Care Reviewed with: patient    Subjective     Chief Complaint: I want to go home  Patient/Family stated goals: To go home  Communicated with: RN prior to session.  Pain/Comfort:  · None reported     Objective:     Communicated with: RN prior to session.  Patient found HOB elevated with: telemetry, pulse ox (continuous) upon OT entry to room.    General Precautions: Standard, other (see comments) (SBP<140)   Orthopedic Precautions:LUE non weight bearing   Respiratory Status: Room air    Occupational Performance:    Bed Mobility:    · Patient completed Supine to Sit with independence  · Patient completed Sit to Supine with independence    Functional Mobility/Transfers:  · Patient completed Sit <> Stand Transfer with independence with gait belt   · Patient  completed Toilet Transfer Step Transfer technique mod I with gait belt, increased time  · Patient completed Tub Transfer step over technique with supervision with gait belt   · Functional Mobility: Pt able to perform transfers and functional mobility with increased time and gait belt to ensure safety. Pt demonstrated good safety awareness when performing tranfers. Pt noted to be ambulating with decreased speed but stated that he was just trying to be cautious so he does not fall. Pt observed following NWB precautions throughout transfers and functional mobility.    Activities of Daily Living:  · Upper Body Dressing: supervision pt able to don/doff gown  · Lower Body Dressing: supervision pt able to don/doff socks  · Toileting: pt able to perform toileting and toileting hygiene after BM at mod I level while adhering to NWB pxns     Cognitive/Visual Perceptual:  Cognitive/Psychosocial Skills:     -       Oriented to: Person, Place, Time and Situation   -       Follows Commands/attention:pt able to follow multi-commands in [place], pt noted to become distracted but appears to be baseline personality   -       Safety awareness/insight to disability: intact   -       Mood/Affect/Coping skills/emotional control: Appropriate to situation, Cooperative and Pleasant  Visual/Perceptual:      -Intact     Physical Exam:  Upper Extremity Strength:    -       Right Upper Extremity: WFL  -       Left Upper Extremity: in ace bandages and NWB, but pt able to utilize digits 1-3 to help with LB dressing. Shoulder and elbow noted to be WFL.     Treatment & Education:  OT educated pt on POC, pt verbalized agreement. OT verified assistance available if d/c home, pt stated that he has coworkers and family that can check up on him throughout the day. Pts place of residence is next to [facility] and someone is always next door in the office per pt report. Pt states that he feels safe and comfortable going home. Pt demonstrated good safety  awareness with mobility and transfers, while observed following NWB at all times. OT educated pt on fall safety, pt stated that he has been extra cautious when ambulating in the room. Pt educated on safe tub transfer, pt showed understanding by demonstrating safe transfer in ADL bathroom in therapy area.  Goals updated.    Patient left HOB elevated with all lines intact and call button in reach    GOALS:   Multidisciplinary Problems       Occupational Therapy Goals          Problem: Occupational Therapy    Goal Priority Disciplines Outcome Interventions   Occupational Therapy Goal     OT, PT/OT Ongoing, Progressing    Description: Goals to be met by: 7/14/22    Patient will increase functional independence with ADLs by performing:    UE dressing with mod I (excluding fasteners).  LE Dressing with Modified [place].  Grooming while standing at sink with independent (upgraded 7/11).  Toileting from toilet with Supervision for hygiene and clothing management-met 7/11  Pt will increased L UE shld flexion strength to 4+/5.  Pt will perform IADLs (light housekeeping) at independent level.                         History:     History reviewed. No pertinent past medical history.    Past Surgical History:   Procedure Laterality Date    CLOSED REDUCTION OF FRACTURE OF HAND WITH PERCUTANEOUS PINNING Left 7/1/2022    Procedure: CLOSED REDUCTION, FRACTURE, HAND, WITH PERCUTANEOUS PINNING;  Surgeon: Ministerio Moran MD;  Location: [facility];  Service: Orthopedics;  Laterality: Left;    CRANIOTOMY N/A 6/23/2022    Procedure: CRANIOTOMY;  Surgeon: Kay Barron MD;  Location: [facility];  Service: Neurosurgery;  Laterality: N/A;  bifrontal       Time Tracking:     OT Date of Treatment: 07/11/22  OT Start Time: 1038  OT Stop Time: 1117  OT Total Time (min): 39 min    Billable Minutes:Re-eval     7/11/2022  Direct supervision throughout, note edited and signed by OT Krysten lBoom

## 2022-07-11 NOTE — PLAN OF CARE
Problem: Adult Inpatient Plan of Care  Goal: Plan of Care Review  7/11/2022 1351 by Kelvin Alaniz LPN  Outcome: Met  7/11/2022 1350 by Kelvin Alaniz LPN  Outcome: Ongoing, Progressing  Goal: Patient-Specific Goal (Individualized)  7/11/2022 1351 by Kelvin Alaniz LPN  Outcome: Met  7/11/2022 1350 by Kelvin Alaniz LPN  Outcome: Ongoing, Progressing  Goal: Absence of Hospital-Acquired Illness or Injury  7/11/2022 1351 by Kelvin Alaniz LPN  Outcome: Met  7/11/2022 1350 by Kelvin Alaniz LPN  Outcome: Ongoing, Progressing  Goal: Optimal Comfort and Wellbeing  7/11/2022 1351 by Kelvin Alaniz LPN  Outcome: Met  7/11/2022 1350 by Kelvin Alaniz LPN  Outcome: Ongoing, Progressing  Goal: Readiness for Transition of Care  7/11/2022 1351 by Kelvin Alaniz LPN  Outcome: Met  7/11/2022 1350 by Kelvin Alaniz LPN  Outcome: Ongoing, Progressing     Problem: Infection  Goal: Absence of Infection Signs and Symptoms  7/11/2022 1351 by Kelvin Alaniz LPN  Outcome: Met  7/11/2022 1350 by Kelvin Alaniz LPN  Outcome: Ongoing, Progressing     Problem: Impaired Wound Healing  Goal: Optimal Wound Healing  7/11/2022 1351 by Kelvin Alaniz LPN  Outcome: Met  7/11/2022 1350 by Kelvin Alaniz LPN  Outcome: Ongoing, Progressing     Problem: Skin Injury Risk Increased  Goal: Skin Health and Integrity  7/11/2022 1351 by Kelvin Alaniz LPN  Outcome: Met  7/11/2022 1350 by Kelvin Alaniz LPN  Outcome: Ongoing, Progressing     Problem: Fall Injury Risk  Goal: Absence of Fall and Fall-Related Injury  7/11/2022 1351 by Kelvin Alaniz LPN  Outcome: Met  7/11/2022 1350 by Kelvin Alaniz LPN  Outcome: Ongoing, Progressing

## 2022-07-11 NOTE — PLAN OF CARE
Pt is up ad paul and is ready for dc home. Nursing will show him how to wrap arm and tells me he often takes this off.   Call place to Myles at [facility] and left message for her to call me  Called placed to Obre also left message for him to call me  Pt is in the shower.

## 2022-07-11 NOTE — PT/OT/SLP RE-EVAL
Physical Therapy Re-evaluation    Patient Name:  Alexander Ortiz   MRN:  [id number]    Recommendations:     Discharge Recommendations:  outpatient PT, home health PT   Discharge Equipment Recommendations: none   Barriers to discharge: None    Assessment:     Alexander Ortiz is a 46 y.o. male admitted with a medical diagnosis of Trauma.  He presents with the following impairments/functional limitations:  impaired endurance, impaired functional mobilty, decreased upper extremity function, orthopedic precautions. Pt has made good progress toward functional mobility goals so updated POC to reflect progress.    Rehab Prognosis:  good; patient would benefit from acute skilled PT services to address these deficits and reach maximum level of function.      Recent Surgery: Procedure(s) (LRB):  CLOSED REDUCTION, FRACTURE, HAND, WITH PERCUTANEOUS PINNING (Left) 10 Days Post-Op    Plan:     During this hospitalization, patient to be seen daily to address the above listed problems via gait training, therapeutic activities, therapeutic exercises, neuromuscular re-education  · Plan of Care Expires:  08/01/22   Plan of Care Reviewed with: patient    Subjective     Communicated with RN prior to session.  Patient found ambulatory in room/orantes with OT with peripheral IV upon PT entry to room, agreeable to evaluation.      Chief Complaint: none  Patient comments/goals: to go home3  Pain/Comfort:  · Pain Rating 1: 0/10    Patients cultural, spiritual, Sikh conflicts given the current situation: no      Objective:     Patient found with: peripheral IV     General Precautions: Standard, fall   Orthopedic Precautions: (NWB L hand)   Braces: N/A  Respiratory Status: Room air    Exams:  · Cognitive Exam:  Patient is oriented to Person, Place, Time and Situation    Functional Mobility:  · Bed Mobility:     · Sit to Supine: supervision  · Transfers:     · Sit to Stand:  supervision with no AD  · Gait: pt amb x 300 ft w/out use of an AD w/  a slowed step through gt pattern. pt was able to tolerated multi directional perturbations and scan environment while maintaining good balance durin gambulation. Pt navigated 4 standard steps w/ use of unilateral railing and step through pattern w/ CGA.     AM-PAC 6 CLICK MOBILITY  Total Score:23         Patient left HOB elevated with all lines intact and call button in reach.    GOALS:   Multidisciplinary Problems     Physical Therapy Goals        Problem: Physical Therapy    Goal Priority Disciplines Outcome Goal Variances Interventions   Physical Therapy Goal     PT, PT/OT Ongoing, Progressing     Description: Goals to be met by: 22     Goals updated 2022    Patient will increase functional independence with mobility by performin. Supine to sit with [place]  2. Sit to supine with [place]  3. Sit to stand transfer with [place]  4. Gait  x 500 feet with Modified [place] using no assistive device.                     History:     History reviewed. No pertinent past medical history.    Past Surgical History:   Procedure Laterality Date    CLOSED REDUCTION OF FRACTURE OF HAND WITH PERCUTANEOUS PINNING Left 2022    Procedure: CLOSED REDUCTION, FRACTURE, HAND, WITH PERCUTANEOUS PINNING;  Surgeon: Ministerio Moran MD;  Location: [facility];  Service: Orthopedics;  Laterality: Left;    CRANIOTOMY N/A 2022    Procedure: CRANIOTOMY;  Surgeon: Kay Barron MD;  Location: [facility];  Service: Neurosurgery;  Laterality: N/A;  bifrontal       Time Tracking:     PT Received On: 22  PT Start Time: 1100     PT Stop Time: 1112  PT Total Time (min): 12 min     Billable Minutes: Re-eval 12 mins      2022

## 2022-07-11 NOTE — DISCHARGE SUMMARY
[facility] - [facility]  General Surgery  Discharge Summary      Patient Name: Alexander Ortiz  MRN: [id number]  Admission Date: 6/23/2022  Hospital Length of Stay: 18 days  Discharge Date and Time:  07/11/2022 11:28 AM  Attending Physician: Maury Quinones Jr., MD   Discharging Provider: ANIRUDH Saldana  Primary Care Provider: Primary Doctor No    HPI:   No notes on file    Procedure(s) (LRB):  CLOSED REDUCTION, FRACTURE, HAND, WITH PERCUTANEOUS PINNING (Left)      Indwelling Lines/Drains at time of discharge:   Lines/Drains/Airways     None               Hospital Course: 46-year-old male and was managed by our team, the ICU team, and neurosurgery initially.  He was also found to have a left 4th and 5th metacarpal fracture that eventually did get fixation by the orthopedic team.  He does have a splint on the left hand.  Incidentally he was found to have bilateral PEs on admission and did get an IVC filter on admit due to the head injury and the inability to anticoagulate. We subsequently started full-dose anticoagulation and removed the IVC filter.  It is imperative that he sees a primary care provider upon discharge.  I will send him on 3 months of Eliquis and the  is aware that he needs primary care follow-up.  He will also need follow-up with Neurosurgery and Orthopedics as well.  Initially the plan was to send the patient to rehab but he is progressing rapidly now is safe for discharge home.  This morning on my examination he is oriented and mentating well.  He has no complaints.  He is tolerating modified diet.  He no longer needs his Seroquel.  We will see him on a short dose of opiates.      Goals of Care Treatment Preferences:  Code Status: Full Code      Consults:   Consults (From admission, onward)        Status Ordering Provider     Inpatient consult to Interventional Radiology  Once        Provider:  (Not yet assigned)    Completed MITCHEL ARREDONDO     Inpatient  consult to Interventional Radiology  Once        Provider:  (Not yet assigned)    Completed MITCHEL ARREDONDO     Inpatient consult to Social Work/Case Management  Once        Provider:  (Not yet assigned)    Acknowledged MITCHEL ARREDONDO     Inpatient consult to Urology  Once        Provider:  Kristopher Bermudez MD    Completed YESSI MAYS     Inpatient consult to Registered Dietitian/Nutritionist  Once        Provider:  (Not yet assigned)    Completed YESSI MAYS     Inpatient consult to Orthopedic Surgery  Once        Provider:  Tuan Tavarez Jr., MD    Completed RTACY KAT     Inpatient consult to ENT/Otolaryngology  Once        Provider:  Jeffery Teague Jr., MD    Acknowledged INNA COYLE     Inpatient consult to Neurosurgery  Once        Provider:  Kay Barron MD    Completed INNA [place]YLE          Significant Diagnostic Studies: Labs: All labs within the past 24 hours have been reviewed  Radiology: X-Ray: CXR: X-Ray Chest 1 View (CXR):   Results for orders placed or performed during the hospital encounter of 06/23/22   X-Ray Chest 1 View    Narrative    EXAMINATION:  XR CHEST 1 VIEW    CLINICAL HISTORY:  respiratory failure;    TECHNIQUE:  Single frontal view of the chest was performed.    COMPARISON:  06/23/2022 at 11:06 hours    FINDINGS:  LINES AND TUBES: Endotracheal tube projects over the trachea with tip above the arturo.  Enteric tube courses below the diaphragm.  EKG/telemetry leads overlie the chest.    MEDIASTINUM AND MESSI: The cardiac silhouette is normal.    LUNGS: Improved right upper lobe aeration.  Mild persistent patchy atelectasis.    PLEURA:No pleural effusion. No pneumothorax.    BONES: No acute osseous abnormality.      Impression    Improved right upper lobe aeration.  Mild persistent patchy atelectasis      Electronically signed by: Michelle Ray  Date:    06/24/2022  Time:    10:43    and KUB: X-Ray Abdomen AP 1 View (KUB): No results found for this visit on  06/23/22.    Pending Diagnostic Studies:     Procedure Component Value Units Date/Time    IR IVC Filter Removal [[id number]] Resulted: 07/08/22 1450    Order Status: Sent Lab Status: In process Updated: 07/08/22 1451    IR Ultrasound Guidance [[id number]] Resulted: 07/08/22 1523    Order Status: Sent Lab Status: In process Updated: 07/08/22 1523        Final Active Diagnoses:    Diagnosis Date Noted POA    PRINCIPAL PROBLEM:  Trauma [T14.90XA] 06/28/2022 Yes      Problems Resolved During this Admission:      Discharged Condition: good    Disposition: Home or Self Care    Follow Up:   Follow-up Information     Kay Barron MD. Call in 2 week(s).    Specialty: Neurosurgery  Why: Follow up Appt: 07/28/2022 @9007  Contact information:  [address] [place] [address]  [phone]                       Patient Instructions:      Activity as tolerated     Medications:  Reconciled Home Medications:      Medication List      START taking these medications    apixaban 5 mg Tab  Commonly known as: ELIQUIS  Take 1 tablet (5 mg total) by mouth 2 (two) times daily.     oxyCODONE 5 MG immediate release tablet  Commonly known as: ROXICODONE  Take 1 tablet (5 mg total) by mouth every 4 (four) hours as needed for Pain.          Time spent on the discharge of patient: 30 minutes    Austin Smallwood, ANIRUDH  General Surgery  [facility]

## 2022-07-11 NOTE — PLAN OF CARE
Nursing has let pt know of discharge  Austin updates that pt will need Eliquis for at least 3 months maybe longer. His meds are down stairs at our pharmacy. Pt can use the coupon for the first fill of 30 days.  Pt will also need a dr to follow this and when discharged. I have left voice message with Alejo Yoder with his workmans comp and also sent an email requesting he call me

## 2022-07-11 NOTE — HOSPITAL COURSE
46-year-old male and was managed by our team, the ICU team, and neurosurgery initially.  He was also found to have a left 4th and 5th metacarpal fracture that eventually did get fixation by the orthopedic team.  He does have a splint on the left hand.  Incidentally he was found to have bilateral PEs on admission and did get an IVC filter on admit due to the head injury and the inability to anticoagulate. We subsequently started full-dose anticoagulation and removed the IVC filter.  It is imperative that he sees a primary care provider upon discharge.  I will send him on 3 months of Eliquis and the  is aware that he needs primary care follow-up.  He will also need follow-up with Neurosurgery and Orthopedics as well.  Initially the plan was to send the patient to rehab but he is progressing rapidly now is safe for discharge home.  This morning on my examination he is oriented and mentating well.  He has no complaints.  He is tolerating modified diet.  He no longer needs his Seroquel.  We will see him on a short dose of opiates.
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
## 2022-07-13 ENCOUNTER — PATIENT OUTREACH (OUTPATIENT)
Dept: ADMINISTRATIVE | Facility: CLINIC | Age: 47
End: 2022-07-13
Payer: OTHER MISCELLANEOUS

## 2022-07-13 NOTE — PROGRESS NOTES
C3 nurse spoke with Alexander Ortiz   for a TCC post hospital discharge follow up call. The patient has a scheduled HOSFU appointment with Primary Doctor No   on 07/28/2022 @ not sure of time its is written down.

## 2022-07-20 ENCOUNTER — HOSPITAL ENCOUNTER (OUTPATIENT)
Dept: RADIOLOGY | Facility: CLINIC | Age: 47
Discharge: HOME | End: 2022-07-20
Attending: REHABILITATION UNIT
Payer: OTHER MISCELLANEOUS

## 2022-07-20 ENCOUNTER — OFFICE VISIT (OUTPATIENT)
Dept: ORTHOPEDICS | Facility: CLINIC | Age: 47
End: 2022-07-20
Payer: OTHER MISCELLANEOUS

## 2022-07-20 VITALS
HEART RATE: 88 BPM | HEIGHT: 69 IN | BODY MASS INDEX: 35.53 KG/M2 | DIASTOLIC BLOOD PRESSURE: 90 MMHG | SYSTOLIC BLOOD PRESSURE: 137 MMHG | WEIGHT: 239.88 LBS

## 2022-07-20 DIAGNOSIS — Z51.89 AFTERCARE: ICD-10-CM

## 2022-07-20 DIAGNOSIS — Z51.89 AFTERCARE: Primary | ICD-10-CM

## 2022-07-20 PROCEDURE — 99024 POSTOP FOLLOW-UP VISIT: CPT | Mod: ,,, | Performed by: REHABILITATION UNIT

## 2022-07-20 PROCEDURE — 73130 X-RAY EXAM OF HAND: CPT | Mod: LT,,, | Performed by: REHABILITATION UNIT

## 2022-07-20 PROCEDURE — 99024 PR POST-OP FOLLOW-UP VISIT: ICD-10-PCS | Mod: ,,, | Performed by: REHABILITATION UNIT

## 2022-07-20 PROCEDURE — 73130 XR HAND COMPLETE 3 VIEW LEFT: ICD-10-PCS | Mod: LT,,, | Performed by: REHABILITATION UNIT

## 2022-07-20 NOTE — PROGRESS NOTES
Subjective:      Patient ID: Alexander Ortiz is a 46 y.o. male.    Chief Complaint: Post-op Evaluation (Post-op for left closed reduction hand fx, pt states everything is good, said hand feels better than what it was yesterday, )    Date of operative procedure: 7/1/22    Procedure:  1. Closed reduction percutaneous pinning L 4/5 MC fx/ d/l and hamate fx   2. Closed management of L 4th proximal phalanx base fracture     Alexander Ortiz returns to the clinic today for his 1st post-operative examination.  Entire visit conducted via video .  He is doing very well today.  He denies any significant pain.  No sensory changes.  He has been nonweightbearing in a splint and tolerated this well.  No fevers, chills, or night sweats reported.      Objective:    Ortho/SPM Exam    Vitals:    07/20/22 1418   BP: (!) 137/90   Pulse: 88     Gen: NAD    LUE:  The skin is intact.  Persistent mild soft tissue swelling.  K wires securely in place with appropriately healed pin sites with no erythema, fluctuance, drainage, or induration.  Appropriate tenderness to palpation known fracture sites.  Sensation is intact to light touch to the median/radial/ulnar nerve distributions. 3 out of 5 muscle strength to the AIN/PIN/ulnar nerves.  Brisk capillary refill to all fingers.  No pain out of proportion to expectation.  No excessive pain with passive stretch of muscles in any compartment.  Temperature and color of extremity appropriate.  Brisk capillary refill in all digits.  Compartments compressible without evidence of excessive firmness.    Imaging:  Three-view radiographs of the left hand obtained today personally reviewed showing postoperative changes from percutaneous pinning with K-wires in appropriate position with no signs of hardware failure.  CMC joints remained well reduced.  The overall alignment is maintained.  No new injuries or complications visualized.        Assessment:       Encounter Diagnosis   Name Primary?     Aftercare Yes         Plan:       Alexander was seen today for post-op evaluation.    Diagnoses and all orders for this visit:    Aftercare  -     X-Ray Hand 3 view Left; Future    Patient is doing well today.  Been sites are healing appropriately with no signs or symptoms of infection.  He is placed into a well-padded short-arm cast.  Cast care was discussed.  He will maintain this for an additional 3 weeks.  Nonweightbearing.  Continue with elevation pain control.  I will see him back in 3 weeks with repeat radiographs out of the cast.    Follow-up: Alexander Jiménezuez to follow up in 3 weeks. If there are any questions prior to this, the patient was instructed to contact the office.

## 2022-07-23 PROBLEM — Z51.89 AFTERCARE: Status: ACTIVE | Noted: 2022-07-23

## 2022-08-10 ENCOUNTER — OFFICE VISIT (OUTPATIENT)
Dept: ORTHOPEDICS | Facility: CLINIC | Age: 47
End: 2022-08-10
Payer: OTHER MISCELLANEOUS

## 2022-08-10 ENCOUNTER — HOSPITAL ENCOUNTER (OUTPATIENT)
Dept: RADIOLOGY | Facility: CLINIC | Age: 47
Discharge: HOME | End: 2022-08-10
Attending: REHABILITATION UNIT
Payer: OTHER MISCELLANEOUS

## 2022-08-10 VITALS — HEIGHT: 69 IN | BODY MASS INDEX: 35.53 KG/M2 | HEART RATE: 88 BPM | WEIGHT: 239.88 LBS

## 2022-08-10 DIAGNOSIS — Z51.89 AFTERCARE: ICD-10-CM

## 2022-08-10 DIAGNOSIS — Z51.89 AFTERCARE: Primary | ICD-10-CM

## 2022-08-10 PROCEDURE — 99024 POSTOP FOLLOW-UP VISIT: CPT | Mod: ,,, | Performed by: REHABILITATION UNIT

## 2022-08-10 PROCEDURE — 73130 XR HAND COMPLETE 3 VIEW LEFT: ICD-10-PCS | Mod: LT,,, | Performed by: REHABILITATION UNIT

## 2022-08-10 PROCEDURE — 73130 X-RAY EXAM OF HAND: CPT | Mod: LT,,, | Performed by: REHABILITATION UNIT

## 2022-08-10 PROCEDURE — 99024 PR POST-OP FOLLOW-UP VISIT: ICD-10-PCS | Mod: ,,, | Performed by: REHABILITATION UNIT

## 2022-08-10 NOTE — PROGRESS NOTES
Subjective:      Patient ID: Alexander Ortiz is a 46 y.o. male.    Chief Complaint: Follow-up (f/u left closed reduction hand fx, pt states he is doing better & pain meds are working, no complaints)    Date of operative procedure: 7/1/22    Procedure:  1. Closed reduction percutaneous pinning L 4/5 MC fx/ d/l and hamate fx   2. Closed management of L 4th proximal phalanx base fracture     Alexander Ortiz returns to the clinic today for his 2nd post-operative examination.   was used.  He is doing very well today.  He denies any significant pain.  No sensory changes.  He has been nonweightbearing in a cast and tolerated this well.  No fevers, chills, or night sweats reported.      Objective:     Vitals:    08/10/22 1410   Pulse: 88     Gen: NAD    LUE:  The skin is intact.  Persistent mild soft tissue swelling.  K wires securely in place with appropriately healed pin sites with no erythema, fluctuance, drainage, or induration. No tenderness to palpation known fracture sites.  Sensation is intact to light touch to the median/radial/ulnar nerve distributions. 3 out of 5 muscle strength to the AIN/PIN/ulnar nerves.  Brisk capillary refill to all fingers.  No pain out of proportion to expectation.  No excessive pain with passive stretch of muscles in any compartment.  Temperature and color of extremity appropriate.  Brisk capillary refill in all digits.  Compartments compressible without evidence of excessive firmness.    Imaging:  Three-view radiographs of the left hand obtained today personally reviewed showing postoperative changes from percutaneous pinning with K-wires in appropriate position with no signs of hardware failure.  CMC joints remained well reduced.  The overall alignment is maintained.  No new injuries or complications visualized.    Repeat radiographs following pin removal show stable appearance with CMC joints remaining well reduced.  The overall alignment is maintained.  No new injuries or  complications visualized.        Assessment:       Encounter Diagnosis   Name Primary?    Aftercare Yes         Plan:       Alexander was seen today for follow-up.    Diagnoses and all orders for this visit:    Aftercare  -     X-Ray Hand 3 view Left; Future    Patient is doing well today.  No pain at fracture sites. He is almost 6 weeks out from injury. Pins were removed in clinic and he tolerated this well. Repeat films stable after pin removal. Placed into removable splint. Continue nonweightbearing.  Continue with elevation and pain control. Start OT for motion. I will see him back in 4 weeks for motion check with repeat radiographs.    Follow-up: Alexander Jiménezuez to follow up in 4 weeks. If there are any questions prior to this, the patient was instructed to contact the office.

## 2022-09-07 ENCOUNTER — HOSPITAL ENCOUNTER (OUTPATIENT)
Dept: RADIOLOGY | Facility: CLINIC | Age: 47
Discharge: HOME | End: 2022-09-07
Attending: REHABILITATION UNIT
Payer: OTHER MISCELLANEOUS

## 2022-09-07 ENCOUNTER — OFFICE VISIT (OUTPATIENT)
Dept: ORTHOPEDICS | Facility: CLINIC | Age: 47
End: 2022-09-07
Payer: OTHER MISCELLANEOUS

## 2022-09-07 VITALS
HEIGHT: 69 IN | WEIGHT: 239 LBS | SYSTOLIC BLOOD PRESSURE: 127 MMHG | DIASTOLIC BLOOD PRESSURE: 81 MMHG | HEART RATE: 74 BPM | BODY MASS INDEX: 35.4 KG/M2

## 2022-09-07 DIAGNOSIS — M79.642 LEFT HAND PAIN: ICD-10-CM

## 2022-09-07 DIAGNOSIS — M79.642 LEFT HAND PAIN: Primary | ICD-10-CM

## 2022-09-07 PROCEDURE — 99024 POSTOP FOLLOW-UP VISIT: CPT | Mod: ,,, | Performed by: REHABILITATION UNIT

## 2022-09-07 PROCEDURE — 73130 X-RAY EXAM OF HAND: CPT | Mod: LT,,, | Performed by: REHABILITATION UNIT

## 2022-09-07 PROCEDURE — 99024 PR POST-OP FOLLOW-UP VISIT: ICD-10-PCS | Mod: ,,, | Performed by: REHABILITATION UNIT

## 2022-09-07 PROCEDURE — 73130 XR HAND COMPLETE 3 VIEW LEFT: ICD-10-PCS | Mod: LT,,, | Performed by: REHABILITATION UNIT

## 2022-09-07 NOTE — PROGRESS NOTES
Subjective:      Patient ID: Alexander Ortiz is a 47 y.o. male.    Chief Complaint: Follow-up (f/u left closed reduction hand, reports a little pain due to PT, currently taking medication the dr prescribed)    Date of operative procedure: 7/1/22    Procedure:  1. Closed reduction percutaneous pinning L 4/5 MC fx/ d/l and hamate fx   2. Closed management of L 4th proximal phalanx base fracture     Alexander Ortiz returns to the clinic today for his 3rd post-operative examination.  He is doing very well today.  He denies any significant pain.  No sensory changes.  No wound issues. He started OT this week and has been to two sessions. He is working on HEP. He has been working on motion on his own.     Objective:     Vitals:    09/07/22 1349   BP: 127/81   Pulse: 74     Gen: NAD    LUE:  The skin is intact.  Persistent mild soft tissue swelling which is improving.  Pin sites are well healed. No tenderness to palpation known fracture sites.  Sensation is intact to light touch to the median/radial/ulnar nerve distributions. 5/5 muscle strength to the AIN/PIN/ulnar nerves.  Brisk capillary refill to all fingers.  No pain out of proportion to expectation.  No excessive pain with passive stretch of muscles in any compartment.  Temperature and color of extremity appropriate.  Brisk capillary refill in all digits.  Compartments compressible without evidence of excessive firmness.  He has great range of motion with full extension of all digits and is able to make a composite fist with normal cascade.  He is able to oppose all fingers to his thumb other than lacks some to the small finger.    Imaging:  Three-view radiographs of the left hand obtained today personally reviewed showing stable appearance of fractures with CMC joints remaining well reduced.  The overall alignment is maintained.  No new injuries or complications visualized.        Assessment:       Encounter Diagnosis   Name Primary?    Left hand pain Yes          Plan:       Alexander was seen today for follow-up.    Diagnoses and all orders for this visit:    Left hand pain  -     X-Ray Hand 3 view Left; Future  Patient is doing very well today.  No pain at fracture sites. He is over 2 months out from injury. Continue therapy for ROM and progress strengthening. I will see him back in 4 weeks for motion check with repeat radiographs.    Follow-up: Alexander Jiménezuez to follow up in 4 weeks. If there are any questions prior to this, the patient was instructed to contact the office.

## 2022-09-26 DIAGNOSIS — S09.90XA HEAD INJURY: Primary | ICD-10-CM

## 2022-09-29 ENCOUNTER — HOSPITAL ENCOUNTER (OUTPATIENT)
Dept: RADIOLOGY | Facility: HOSPITAL | Age: 47
Discharge: HOME | End: 2022-09-29
Attending: NEUROLOGICAL SURGERY
Payer: OTHER MISCELLANEOUS

## 2022-09-29 DIAGNOSIS — S09.90XA HEAD INJURY: ICD-10-CM

## 2022-09-29 PROCEDURE — 70450 CT HEAD/BRAIN W/O DYE: CPT | Mod: TC

## 2022-10-05 ENCOUNTER — HOSPITAL ENCOUNTER (OUTPATIENT)
Dept: RADIOLOGY | Facility: CLINIC | Age: 47
Discharge: HOME | End: 2022-10-05
Attending: REHABILITATION UNIT
Payer: OTHER MISCELLANEOUS

## 2022-10-05 ENCOUNTER — OFFICE VISIT (OUTPATIENT)
Dept: ORTHOPEDICS | Facility: CLINIC | Age: 47
End: 2022-10-05
Payer: OTHER MISCELLANEOUS

## 2022-10-05 VITALS
SYSTOLIC BLOOD PRESSURE: 125 MMHG | DIASTOLIC BLOOD PRESSURE: 78 MMHG | WEIGHT: 239 LBS | TEMPERATURE: 97 F | HEART RATE: 80 BPM | HEIGHT: 69 IN | BODY MASS INDEX: 35.4 KG/M2

## 2022-10-05 DIAGNOSIS — M79.642 LEFT HAND PAIN: Primary | ICD-10-CM

## 2022-10-05 DIAGNOSIS — M79.642 LEFT HAND PAIN: ICD-10-CM

## 2022-10-05 DIAGNOSIS — Z51.89 AFTERCARE: ICD-10-CM

## 2022-10-05 PROCEDURE — 99203 OFFICE O/P NEW LOW 30 MIN: CPT | Mod: ,,, | Performed by: REHABILITATION UNIT

## 2022-10-05 PROCEDURE — 99203 PR OFFICE/OUTPT VISIT, NEW, LEVL III, 30-44 MIN: ICD-10-PCS | Mod: ,,, | Performed by: REHABILITATION UNIT

## 2022-10-05 PROCEDURE — 73130 XR HAND COMPLETE 3 VIEW LEFT: ICD-10-PCS | Mod: LT,,, | Performed by: REHABILITATION UNIT

## 2022-10-05 PROCEDURE — 73130 X-RAY EXAM OF HAND: CPT | Mod: LT,,, | Performed by: REHABILITATION UNIT

## 2022-10-05 NOTE — PROGRESS NOTES
Subjective:      Patient ID: Alexander Ortiz is a 47 y.o. male.    Chief Complaint: No chief complaint on file.    Date of operative procedure: 7/1/22    Procedure:  1. Closed reduction percutaneous pinning L 4/5 MC fx/ d/l and hamate fx   2. Closed management of L 4th proximal phalanx base fracture     Alexander Ortiz returns to the clinic today for his 4th post-operative examination. He is doing very well today.  He denies any significant pain.  No sensory changes.  No wound issues.  He has been in occupational therapy and is progressing well.  He reports that he initially had 20 lb of  streak to the operative extremity and has increase this to 60 lb.  His nonoperative extremity has a baseline of 80 lb.  He is pleased with his progress.  He has been compliant with therapy and is working on his home exercise program as well.  He has excellent motion but is continuing to increase his strength and endurance.      Objective:     There were no vitals filed for this visit.    Gen: NAD    LUE:  The skin is intact.  Minimal soft tissue swelling which is improving.  Pin sites are well healed. No tenderness to palpation at known fracture sites.  Sensation is intact to light touch to the median/radial/ulnar nerve distributions. 5/5 muscle strength to the AIN/PIN/ulnar nerves.  strength improving. Brisk capillary refill to all fingers.  No pain out of proportion to expectation.  No excessive pain with passive stretch of muscles in any compartment.  Temperature and color of extremity appropriate.  Brisk capillary refill in all digits.  Compartments compressible without evidence of excessive firmness.  He has great range of motion with full extension of all digits and is able to make a composite fist with normal cascade.  He is able to oppose all fingers to his thumb.    Imaging:  Three-view radiographs of the left hand obtained today personally reviewed showing stable appearance of fractures with CMC joints remaining  well reduced.  The overall alignment is maintained.  No new injuries or complications visualized.        Assessment:       Encounter Diagnosis   Name Primary?    Left hand pain Yes         Plan:       Diagnoses and all orders for this visit:    Left hand pain  Patient is doing very well today.  He has no pain and excellent range of motion.  He is continuing to work on strengthening at therapy.  I have refilled his occupational therapy.  He is continuing to see progress. I will see him back in 6 weeks with repeat radiographs.  All his questions were answered and he was pleased.     Follow-up: Alexander Jiménezuez to follow up in 6 weeks. If there are any questions prior to this, the patient was instructed to contact the office.

## 2022-11-16 ENCOUNTER — OFFICE VISIT (OUTPATIENT)
Dept: ORTHOPEDICS | Facility: CLINIC | Age: 47
End: 2022-11-16
Payer: OTHER MISCELLANEOUS

## 2022-11-16 ENCOUNTER — HOSPITAL ENCOUNTER (OUTPATIENT)
Dept: RADIOLOGY | Facility: CLINIC | Age: 47
Discharge: HOME | End: 2022-11-16
Attending: REHABILITATION UNIT
Payer: OTHER MISCELLANEOUS

## 2022-11-16 VITALS — WEIGHT: 239 LBS | HEIGHT: 69 IN | BODY MASS INDEX: 35.4 KG/M2

## 2022-11-16 DIAGNOSIS — M79.642 LEFT HAND PAIN: ICD-10-CM

## 2022-11-16 DIAGNOSIS — M79.642 LEFT HAND PAIN: Primary | ICD-10-CM

## 2022-11-16 PROCEDURE — 99213 PR OFFICE/OUTPT VISIT, EST, LEVL III, 20-29 MIN: ICD-10-PCS | Mod: ,,, | Performed by: REHABILITATION UNIT

## 2022-11-16 PROCEDURE — 99213 OFFICE O/P EST LOW 20 MIN: CPT | Mod: ,,, | Performed by: REHABILITATION UNIT

## 2022-11-16 PROCEDURE — 73130 X-RAY EXAM OF HAND: CPT | Mod: LT,,, | Performed by: REHABILITATION UNIT

## 2022-11-16 PROCEDURE — 73130 XR HAND COMPLETE 3 VIEW LEFT: ICD-10-PCS | Mod: LT,,, | Performed by: REHABILITATION UNIT

## 2022-11-16 NOTE — PROGRESS NOTES
Subjective:      Patient ID: Alexander Ortiz is a 47 y.o. male.    Chief Complaint: Follow-up (F/u for left closed reduction of hand fx, pt states everything has been going good, has no complaints of pain, skin looks good, wounds helped great, able to open and close hand with no issues, )    Date of operative procedure: 7/1/22    Procedure:  1. Closed reduction percutaneous pinning L 4/5 MC fx/ d/l and hamate fx   2. Closed management of L 4th proximal phalanx base fracture     Alexander Ortiz returns to the clinic today for his 5th post-operative examination.  He is doing great.  He denies any pain.  He has full motion.  His strength is returning.  He completes his approved therapy sessions tomorrow.  He has been off work and will be off through April for repeat neurosurgical evaluation.  He is pleased with his progress and is able to do everything that he needs to do.     Objective:     There were no vitals filed for this visit.    Gen: NAD    LUE:  The skin is intact.  No soft tissue swelling.  Pin sites are well healed. No tenderness to palpation at known fracture sites.  Sensation is intact to light touch to the median/radial/ulnar nerve distributions. 5/5 muscle strength to the AIN/PIN/ulnar nerves. Good and symmetric  strength. Brisk capillary refill to all fingers.  He has great range of motion with full extension of all digits and is able to make a composite fist with normal cascade.  He is able to oppose all fingers to his thumb.    Imaging:  Three-view radiographs of the left hand obtained today personally reviewed showing stable appearance of fractures with CMC joints remaining well reduced.  The overall alignment is maintained.  No new injuries or complications visualized.      Assessment:       Encounter Diagnosis   Name Primary?    Left hand pain Yes         Plan:       Alexander was seen today for follow-up.    Diagnoses and all orders for this visit:    Left hand pain  Patient is doing very well  today.  He has no pain, excellent range of motion, and strength.  He is able to perform all his activities of daily living without issue.  He is very pleased.  He will follow up as needed.  All his questions were answered and he was pleased.     Follow-up: Alexander Ortiz to follow up TOMASZ
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
## (undated) DEVICE — DRAPE SURG W/TWL 17 5/8X23

## (undated) DEVICE — GLOVE PROTEXIS PI SYN SURG 6.5

## (undated) DEVICE — ROUTER STRAIGHT RED 1.1MM

## (undated) DEVICE — GLOVE 7.0 PROTEXIS PI BLUE

## (undated) DEVICE — HEMOSTAT SURGICEL 2X14IN

## (undated) DEVICE — POSITIONER HEEL FOAM CONVOLTD

## (undated) DEVICE — COVER EQUIPMENT 36X25

## (undated) DEVICE — MARKER WRITESITE SKIN CHLRAPRP

## (undated) DEVICE — KIT SURGIFLO HEMOSTATIC MATRIX

## (undated) DEVICE — DISH PETRI MED 3.5IN

## (undated) DEVICE — SUT 4/0 18IN NUROLON BLK B

## (undated) DEVICE — SUT MONOCRYL 3-0 PS-2 UND

## (undated) DEVICE — GOWN SMARTSLEEVE AAMI LVL4 LG

## (undated) DEVICE — SHEET AVIENE MICFIB 1.4X1.4IN

## (undated) DEVICE — SUT BONE WAX 2.5 GRMS 12/BX

## (undated) DEVICE — BAND RUBBER STERILE 1/4X3.5IN

## (undated) DEVICE — PERFORATOR SURG CRAN 14X11MM

## (undated) DEVICE — Device

## (undated) DEVICE — COVER HD BACK TABLE 6FT

## (undated) DEVICE — SEE MEDLINE ITEM 154981

## (undated) DEVICE — DRAPE STERI U-SHAPED 47X51IN

## (undated) DEVICE — COVER CAMERA OPERATING ROOM

## (undated) DEVICE — DRESSING TELFA N ADH 3X8IN

## (undated) DEVICE — BUCKET PLASTER DISPOSABLE

## (undated) DEVICE — PAD CAST SPECIALIST STRL 4

## (undated) DEVICE — TUBING SILICON CLR 3/16IN 10FT

## (undated) DEVICE — TUBE SUCTION MEDI-VAC STERILE

## (undated) DEVICE — DRAIN FLAT JP 7X4MM FUL

## (undated) DEVICE — SEALANT ADHERUS DURAL AUTOSPRY

## (undated) DEVICE — DRAIN JACKSON PRATT TRCR 10FR

## (undated) DEVICE — CUFF ATS 2 PORT SNGL BLDR 18IN

## (undated) DEVICE — RESERVOIR JACKSON-PRATT 100CC

## (undated) DEVICE — COVER MAYO STAND REINFRCD 30

## (undated) DEVICE — DRESSING XEROFORM FOIL PK 1X8

## (undated) DEVICE — SPONGE SURGIFOAM 100 8.5X12X10

## (undated) DEVICE — SUT VICRYL PLUS 3-0 X-1 18IN

## (undated) DEVICE — ROUTER TAPERED 2.3MM

## (undated) DEVICE — CLIPS RANEY SCALP FFS/ASSY

## (undated) DEVICE — SPONGE GAUZE 16PLY 4X4

## (undated) DEVICE — SOL .9NACL PF 100 ML

## (undated) DEVICE — SYR IRRIGATION BULB STER 60ML

## (undated) DEVICE — GLOVE PROTEXIS HYDROGEL SZ7

## (undated) DEVICE — BANDAGE VELCLOSE ELAS 3INX5YD

## (undated) DEVICE — SOL NACL IRR 1000ML BTL

## (undated) DEVICE — GLOVE PROTEXIS LTX MICRO  7.5

## (undated) DEVICE — SUT 3-0 ETHILON 18 FS-1

## (undated) DEVICE — STAPLER SKIN PROXIMATE WIDE

## (undated) DEVICE — SEE MEDLINE ITEM 157103

## (undated) DEVICE — BENZOIN TINCTURE 0.66ML

## (undated) DEVICE — DRAPE OPMI STERILE

## (undated) DEVICE — SEE MEDLINE ITEM 157173

## (undated) DEVICE — POSITIONER HEAD ADULT

## (undated) DEVICE — NDL HYPO 22GX1 1/2 SYR 10ML LL

## (undated) DEVICE — CONTAINER SPECIMEN SCREW 4OZ

## (undated) DEVICE — CLOSURE SKIN STERI STRIP 1/2X4

## (undated) DEVICE — GAUZE SPONGE 4X4 12PLY

## (undated) DEVICE — ELECTRODE PATIENT RETURN DISP

## (undated) DEVICE — SEE MEDLINE ITEM 146313

## (undated) DEVICE — KIT SURGICAL TURNOVER

## (undated) DEVICE — SPONGE NEURO 1/4X1/4

## (undated) DEVICE — DRESSING TELFA N ADH 3X8

## (undated) DEVICE — GLOVE PROTEXIS BLUE LATEX 8

## (undated) DEVICE — INSTRUMENT FRAZIER 10FR W/VENT

## (undated) DEVICE — GLOVE PROTEXIS BLUE LATEX 7

## (undated) DEVICE — COVER FULLGUARD SHOE HIGH-TOP

## (undated) DEVICE — SUT VICRYL 4-0 18 P-3

## (undated) DEVICE — GOWN SURGICAL XX LARGE X LONG

## (undated) DEVICE — APPLICATOR CHLORAPREP ORN 26ML